# Patient Record
Sex: MALE | Race: WHITE | NOT HISPANIC OR LATINO | URBAN - METROPOLITAN AREA
[De-identification: names, ages, dates, MRNs, and addresses within clinical notes are randomized per-mention and may not be internally consistent; named-entity substitution may affect disease eponyms.]

---

## 2020-06-21 ENCOUNTER — EMERGENCY (EMERGENCY)
Facility: HOSPITAL | Age: 80
LOS: 1 days | Discharge: HOME | End: 2020-06-21
Attending: EMERGENCY MEDICINE
Payer: MEDICARE

## 2020-06-21 VITALS
HEART RATE: 65 BPM | DIASTOLIC BLOOD PRESSURE: 70 MMHG | RESPIRATION RATE: 19 BRPM | TEMPERATURE: 98 F | OXYGEN SATURATION: 95 % | SYSTOLIC BLOOD PRESSURE: 122 MMHG

## 2020-06-21 VITALS
DIASTOLIC BLOOD PRESSURE: 74 MMHG | WEIGHT: 199.96 LBS | RESPIRATION RATE: 18 BRPM | HEIGHT: 73 IN | SYSTOLIC BLOOD PRESSURE: 123 MMHG | HEART RATE: 65 BPM | OXYGEN SATURATION: 94 % | TEMPERATURE: 99 F

## 2020-06-21 LAB
ANION GAP SERPL CALC-SCNC: 16 MMOL/L — SIGNIFICANT CHANGE UP (ref 5–17)
APTT BLD: 31.8 SEC — SIGNIFICANT CHANGE UP (ref 27.5–36.3)
BASOPHILS # BLD AUTO: 0.03 K/UL — SIGNIFICANT CHANGE UP (ref 0–0.2)
BASOPHILS NFR BLD AUTO: 0.4 % — SIGNIFICANT CHANGE UP (ref 0–2)
BUN SERPL-MCNC: 33 MG/DL — HIGH (ref 8–20)
CALCIUM SERPL-MCNC: 9.1 MG/DL — SIGNIFICANT CHANGE UP (ref 8.6–10.2)
CHLORIDE SERPL-SCNC: 103 MMOL/L — SIGNIFICANT CHANGE UP (ref 98–107)
CO2 SERPL-SCNC: 17 MMOL/L — LOW (ref 22–29)
CREAT SERPL-MCNC: 1.29 MG/DL — SIGNIFICANT CHANGE UP (ref 0.5–1.3)
D DIMER BLD IA.RAPID-MCNC: 255 NG/ML DDU — HIGH
EOSINOPHIL # BLD AUTO: 0.19 K/UL — SIGNIFICANT CHANGE UP (ref 0–0.5)
EOSINOPHIL NFR BLD AUTO: 2.5 % — SIGNIFICANT CHANGE UP (ref 0–6)
GLUCOSE SERPL-MCNC: 121 MG/DL — HIGH (ref 70–99)
HCT VFR BLD CALC: 40.6 % — SIGNIFICANT CHANGE UP (ref 39–50)
HGB BLD-MCNC: 13.8 G/DL — SIGNIFICANT CHANGE UP (ref 13–17)
IMM GRANULOCYTES NFR BLD AUTO: 0.4 % — SIGNIFICANT CHANGE UP (ref 0–1.5)
INR BLD: 1.05 RATIO — SIGNIFICANT CHANGE UP (ref 0.88–1.16)
LYMPHOCYTES # BLD AUTO: 1.4 K/UL — SIGNIFICANT CHANGE UP (ref 1–3.3)
LYMPHOCYTES # BLD AUTO: 18.5 % — SIGNIFICANT CHANGE UP (ref 13–44)
MCHC RBC-ENTMCNC: 33.3 PG — SIGNIFICANT CHANGE UP (ref 27–34)
MCHC RBC-ENTMCNC: 34 GM/DL — SIGNIFICANT CHANGE UP (ref 32–36)
MCV RBC AUTO: 98.1 FL — SIGNIFICANT CHANGE UP (ref 80–100)
MONOCYTES # BLD AUTO: 0.62 K/UL — SIGNIFICANT CHANGE UP (ref 0–0.9)
MONOCYTES NFR BLD AUTO: 8.2 % — SIGNIFICANT CHANGE UP (ref 2–14)
NEUTROPHILS # BLD AUTO: 5.3 K/UL — SIGNIFICANT CHANGE UP (ref 1.8–7.4)
NEUTROPHILS NFR BLD AUTO: 70 % — SIGNIFICANT CHANGE UP (ref 43–77)
NT-PROBNP SERPL-SCNC: 508 PG/ML — HIGH (ref 0–300)
PLATELET # BLD AUTO: 160 K/UL — SIGNIFICANT CHANGE UP (ref 150–400)
POTASSIUM SERPL-MCNC: 3.9 MMOL/L — SIGNIFICANT CHANGE UP (ref 3.5–5.3)
POTASSIUM SERPL-SCNC: 3.9 MMOL/L — SIGNIFICANT CHANGE UP (ref 3.5–5.3)
PROTHROM AB SERPL-ACNC: 11.9 SEC — SIGNIFICANT CHANGE UP (ref 10–12.9)
RBC # BLD: 4.14 M/UL — LOW (ref 4.2–5.8)
RBC # FLD: 12.8 % — SIGNIFICANT CHANGE UP (ref 10.3–14.5)
SODIUM SERPL-SCNC: 136 MMOL/L — SIGNIFICANT CHANGE UP (ref 135–145)
TROPONIN T SERPL-MCNC: <0.01 NG/ML — SIGNIFICANT CHANGE UP (ref 0–0.06)
TROPONIN T SERPL-MCNC: <0.01 NG/ML — SIGNIFICANT CHANGE UP (ref 0–0.06)
WBC # BLD: 7.57 K/UL — SIGNIFICANT CHANGE UP (ref 3.8–10.5)
WBC # FLD AUTO: 7.57 K/UL — SIGNIFICANT CHANGE UP (ref 3.8–10.5)

## 2020-06-21 PROCEDURE — 85730 THROMBOPLASTIN TIME PARTIAL: CPT

## 2020-06-21 PROCEDURE — 84484 ASSAY OF TROPONIN QUANT: CPT

## 2020-06-21 PROCEDURE — 93010 ELECTROCARDIOGRAM REPORT: CPT

## 2020-06-21 PROCEDURE — 80048 BASIC METABOLIC PNL TOTAL CA: CPT

## 2020-06-21 PROCEDURE — 85379 FIBRIN DEGRADATION QUANT: CPT

## 2020-06-21 PROCEDURE — 99285 EMERGENCY DEPT VISIT HI MDM: CPT

## 2020-06-21 PROCEDURE — 85610 PROTHROMBIN TIME: CPT

## 2020-06-21 PROCEDURE — 71045 X-RAY EXAM CHEST 1 VIEW: CPT

## 2020-06-21 PROCEDURE — 71045 X-RAY EXAM CHEST 1 VIEW: CPT | Mod: 26

## 2020-06-21 PROCEDURE — 36415 COLL VENOUS BLD VENIPUNCTURE: CPT

## 2020-06-21 PROCEDURE — 93005 ELECTROCARDIOGRAM TRACING: CPT

## 2020-06-21 PROCEDURE — 85027 COMPLETE CBC AUTOMATED: CPT

## 2020-06-21 PROCEDURE — 83880 ASSAY OF NATRIURETIC PEPTIDE: CPT

## 2020-06-21 PROCEDURE — 99283 EMERGENCY DEPT VISIT LOW MDM: CPT | Mod: 25

## 2020-06-21 RX ORDER — ASPIRIN/CALCIUM CARB/MAGNESIUM 324 MG
81 TABLET ORAL ONCE
Refills: 0 | Status: COMPLETED | OUTPATIENT
Start: 2020-06-21 | End: 2020-06-21

## 2020-06-21 RX ADMIN — Medication 81 MILLIGRAM(S): at 20:19

## 2020-06-21 NOTE — ED PROVIDER NOTE - PATIENT PORTAL LINK FT
You can access the FollowMyHealth Patient Portal offered by [facility] by registering at the following website: [email or web address]. By joining [employer]s FollowMyHealth portal, you will also be able to view your health information using other applications (apps) compatible with our system.

## 2020-06-21 NOTE — ED PROVIDER NOTE - OBJECTIVE STATEMENT
80 y/o M pt with hx of HTN, BPH and gout presents to ED c/o SOB and L sided CP that onset at 18:00 today. Pt states he became profoundly SOB which worsened with bending over. Pt states he has had SOB over the last few weeks with bending over and getting up from a seated position. Pt saw a cardiologist in [place], and had a normal catheterization 2 years ago. denies fever. denies HA or neck pain. no abd pain. no n/v/d. no urinary f/u/d. no back pain. no motor or sensory deficits. denies illicit drug use. no recent travel. no rash. no other acute issues symptoms or concerns

## 2020-06-21 NOTE — ED PROVIDER NOTE - NSFOLLOWUPCLINICS_GEN_ALL_ED_FT
[facility]  Cardiology  [address] [place] [address]  Phone: [phone]  Fax:   Follow Up Time:

## 2020-06-21 NOTE — ED ADULT TRIAGE NOTE - CHIEF COMPLAINT QUOTE
patient alert and oriented x 4 in no distress states in full sentences that he has chest pain with SOB- feels like he is having difficulty breathing describes as gasping like " I just ran"

## 2020-06-21 NOTE — ED PROVIDER NOTE - CLINICAL SUMMARY MEDICAL DECISION MAKING FREE TEXT BOX
pt jay better states he wants d/c home cards rec given for here. do not suspect pe clincally dimer used in age adjusted literature clinically . stressed improtance of pcp and and cards f/u states had neg cath 2 years prior. return to ed for intractable cp sob or any overall worsening

## 2020-06-21 NOTE — ED ADULT NURSE NOTE - OBJECTIVE STATEMENT
pt received A+Ox4, in no apparent distress. pt c/o intermittent CP and SOB since 1800 today, worsening with exertion. pt states hx of one stent 2 yrs ago and hx of HTN, compliant with meds at home per pt. pt denies any fevers, cough, sick contacts. RR even and unlabored at this time, placed on cardiac monitor and  upon arrival. breath sounds clear b/l, pt denies any leg swelling. pt educated on POC, verbalized understanding. pt safety measures maintained.

## 2020-06-21 NOTE — ED PROVIDER NOTE - PROGRESS NOTE DETAILS
Pt in NAD, asking for d/c home, will recommend cardiology f/u. Pt is anxious about getting home. I told pt I would like to repeat troponin prior to d/c, pt agrees to stay for repeat trop, and agrees with plan.

## 2020-06-23 ENCOUNTER — TRANSCRIPTION ENCOUNTER (OUTPATIENT)
Age: 80
End: 2020-06-23

## 2020-06-30 PROBLEM — Z00.00 ENCOUNTER FOR PREVENTIVE HEALTH EXAMINATION: Status: ACTIVE | Noted: 2020-06-30

## 2020-07-01 ENCOUNTER — APPOINTMENT (OUTPATIENT)
Dept: DERMATOLOGY | Facility: CLINIC | Age: 80
End: 2020-07-01
Payer: MEDICARE

## 2020-07-01 ENCOUNTER — APPOINTMENT (OUTPATIENT)
Dept: DERMATOLOGY | Facility: CLINIC | Age: 80
End: 2020-07-01

## 2020-07-01 VITALS — BODY MASS INDEX: 29.12 KG/M2 | HEIGHT: 72 IN | WEIGHT: 215 LBS

## 2020-07-01 DIAGNOSIS — Z84.0 FAMILY HISTORY OF DISEASES OF THE SKIN AND SUBCUTANEOUS TISSUE: ICD-10-CM

## 2020-07-01 DIAGNOSIS — M54.12 RADICULOPATHY, CERVICAL REGION: ICD-10-CM

## 2020-07-01 DIAGNOSIS — Z87.2 PERSONAL HISTORY OF DISEASES OF THE SKIN AND SUBCUTANEOUS TISSUE: ICD-10-CM

## 2020-07-01 DIAGNOSIS — Z87.39 PERSONAL HISTORY OF OTHER DISEASES OF THE MUSCULOSKELETAL SYSTEM AND CONNECTIVE TISSUE: ICD-10-CM

## 2020-07-01 DIAGNOSIS — D69.2 OTHER NONTHROMBOCYTOPENIC PURPURA: ICD-10-CM

## 2020-07-01 PROCEDURE — 99203 OFFICE O/P NEW LOW 30 MIN: CPT

## 2020-07-01 NOTE — PHYSICAL EXAM
[Alert] : alert [Well Nourished] : well nourished [Oriented x 3] : ~L oriented x 3 [FreeTextEntry3] : The following areas were examined and no significant abnormalities were seen except as noted below:\par \par Type II skin\par \par scalp, face, eyelids, nose, lips, ears, neck, chest, abdomen, back,groin, buttocks, right arm, left arm, right hand, left hand,\par right  leg, left leg, right foot, left foot\par \par Left upper cheek: 7 x 5 mm focally avulsed tan verrucous plaque\par Forearms: No rash or purpura present\par \par No suspicious lesion seen\par \par

## 2020-07-01 NOTE — HISTORY OF PRESENT ILLNESS
[FreeTextEntry1] : Blood spots [de-identified] : First visit for this 79-year-old white male presenting with with a few year history of "blood spots" on the forearms.  Previously diagnosed as having "idiopathic purpura" Rash is not present today.\par patient also complains of persistent "tingling" in both forearms, especially the left. Has history of a C3/C4 fusion. Prescribed gabapentin but does not wish to take it.

## 2020-07-01 NOTE — ASSESSMENT
[FreeTextEntry1] : idiopathic senile purpura by history\par Seborrheic keratosis, traumatized the left cheek

## 2020-07-16 ENCOUNTER — APPOINTMENT (OUTPATIENT)
Dept: DERMATOLOGY | Facility: CLINIC | Age: 80
End: 2020-07-16
Payer: MEDICARE

## 2020-07-16 DIAGNOSIS — L82.0 INFLAMED SEBORRHEIC KERATOSIS: ICD-10-CM

## 2020-07-16 PROCEDURE — 17110 DESTRUCTION B9 LES UP TO 14: CPT

## 2020-07-16 NOTE — HISTORY OF PRESENT ILLNESS
[de-identified] : Followup visit for a 80-year-old white male first seen by me on July 1, 2020, presenting today with a tender lesion on the left upper cheek.   [FreeTextEntry1] : Growth on left cheek

## 2020-07-16 NOTE — PHYSICAL EXAM
[Alert] : alert [Oriented x 3] : ~L oriented x 3 [Well Nourished] : well nourished [FreeTextEntry3] : Patient wearing a facemask\par \par Left upper-inner cheek.: 8X7 millimeter crusted tan verrucous plaque

## 2020-07-16 NOTE — REVIEW OF SYSTEMS
[Negative] : Constitutional [de-identified] : Large ecchymosis on left posterior neck secondary to a fall

## 2020-07-22 LAB — CORE LAB BIOPSY: NORMAL

## 2020-08-30 ENCOUNTER — INPATIENT (INPATIENT)
Facility: HOSPITAL | Age: 80
LOS: 3 days | Discharge: HOME | DRG: 286 | End: 2020-09-03
Attending: HOSPITALIST | Admitting: EMERGENCY MEDICINE
Payer: MEDICARE

## 2020-08-30 VITALS
WEIGHT: 214.95 LBS | OXYGEN SATURATION: 95 % | TEMPERATURE: 98 F | HEART RATE: 57 BPM | SYSTOLIC BLOOD PRESSURE: 119 MMHG | HEIGHT: 72 IN | DIASTOLIC BLOOD PRESSURE: 76 MMHG | RESPIRATION RATE: 25 BRPM

## 2020-08-30 DIAGNOSIS — Z98.890 OTHER SPECIFIED POSTPROCEDURAL STATES: Chronic | ICD-10-CM

## 2020-08-30 DIAGNOSIS — Z90.49 ACQUIRED ABSENCE OF OTHER SPECIFIED PARTS OF DIGESTIVE TRACT: Chronic | ICD-10-CM

## 2020-08-30 LAB
ALBUMIN SERPL ELPH-MCNC: 3.8 G/DL — SIGNIFICANT CHANGE UP (ref 3.3–5.2)
ALP SERPL-CCNC: 59 U/L — SIGNIFICANT CHANGE UP (ref 40–120)
ALT FLD-CCNC: 10 U/L — SIGNIFICANT CHANGE UP
ANION GAP SERPL CALC-SCNC: 11 MMOL/L — SIGNIFICANT CHANGE UP (ref 5–17)
APPEARANCE UR: CLEAR — SIGNIFICANT CHANGE UP
AST SERPL-CCNC: 16 U/L — SIGNIFICANT CHANGE UP
BASOPHILS # BLD AUTO: 0.03 K/UL — SIGNIFICANT CHANGE UP (ref 0–0.2)
BASOPHILS NFR BLD AUTO: 0.4 % — SIGNIFICANT CHANGE UP (ref 0–2)
BILIRUB SERPL-MCNC: 0.6 MG/DL — SIGNIFICANT CHANGE UP (ref 0.4–2)
BILIRUB UR-MCNC: NEGATIVE — SIGNIFICANT CHANGE UP
BUN SERPL-MCNC: 20 MG/DL — SIGNIFICANT CHANGE UP (ref 8–20)
CALCIUM SERPL-MCNC: 8.9 MG/DL — SIGNIFICANT CHANGE UP (ref 8.6–10.2)
CHLORIDE SERPL-SCNC: 106 MMOL/L — SIGNIFICANT CHANGE UP (ref 98–107)
CO2 SERPL-SCNC: 22 MMOL/L — SIGNIFICANT CHANGE UP (ref 22–29)
COLOR SPEC: YELLOW — SIGNIFICANT CHANGE UP
CREAT SERPL-MCNC: 0.96 MG/DL — SIGNIFICANT CHANGE UP (ref 0.5–1.3)
DIFF PNL FLD: NEGATIVE — SIGNIFICANT CHANGE UP
EOSINOPHIL # BLD AUTO: 0.32 K/UL — SIGNIFICANT CHANGE UP (ref 0–0.5)
EOSINOPHIL NFR BLD AUTO: 4.4 % — SIGNIFICANT CHANGE UP (ref 0–6)
GLUCOSE SERPL-MCNC: 120 MG/DL — HIGH (ref 70–99)
GLUCOSE UR QL: NEGATIVE MG/DL — SIGNIFICANT CHANGE UP
HCT VFR BLD CALC: 39.2 % — SIGNIFICANT CHANGE UP (ref 39–50)
HGB BLD-MCNC: 13.3 G/DL — SIGNIFICANT CHANGE UP (ref 13–17)
IMM GRANULOCYTES NFR BLD AUTO: 0.5 % — SIGNIFICANT CHANGE UP (ref 0–1.5)
KETONES UR-MCNC: NEGATIVE — SIGNIFICANT CHANGE UP
LEUKOCYTE ESTERASE UR-ACNC: ABNORMAL
LYMPHOCYTES # BLD AUTO: 1.64 K/UL — SIGNIFICANT CHANGE UP (ref 1–3.3)
LYMPHOCYTES # BLD AUTO: 22.5 % — SIGNIFICANT CHANGE UP (ref 13–44)
MAGNESIUM SERPL-MCNC: 1.6 MG/DL — SIGNIFICANT CHANGE UP (ref 1.6–2.6)
MCHC RBC-ENTMCNC: 33.3 PG — SIGNIFICANT CHANGE UP (ref 27–34)
MCHC RBC-ENTMCNC: 33.9 GM/DL — SIGNIFICANT CHANGE UP (ref 32–36)
MCV RBC AUTO: 98.2 FL — SIGNIFICANT CHANGE UP (ref 80–100)
MONOCYTES # BLD AUTO: 0.69 K/UL — SIGNIFICANT CHANGE UP (ref 0–0.9)
MONOCYTES NFR BLD AUTO: 9.5 % — SIGNIFICANT CHANGE UP (ref 2–14)
NEUTROPHILS # BLD AUTO: 4.57 K/UL — SIGNIFICANT CHANGE UP (ref 1.8–7.4)
NEUTROPHILS NFR BLD AUTO: 62.7 % — SIGNIFICANT CHANGE UP (ref 43–77)
NITRITE UR-MCNC: NEGATIVE — SIGNIFICANT CHANGE UP
NT-PROBNP SERPL-SCNC: 647 PG/ML — HIGH (ref 0–300)
PH UR: 5 — SIGNIFICANT CHANGE UP (ref 5–8)
PLATELET # BLD AUTO: 143 K/UL — LOW (ref 150–400)
POTASSIUM SERPL-MCNC: 4 MMOL/L — SIGNIFICANT CHANGE UP (ref 3.5–5.3)
POTASSIUM SERPL-SCNC: 4 MMOL/L — SIGNIFICANT CHANGE UP (ref 3.5–5.3)
PROT SERPL-MCNC: 6.2 G/DL — LOW (ref 6.6–8.7)
PROT UR-MCNC: NEGATIVE MG/DL — SIGNIFICANT CHANGE UP
RBC # BLD: 3.99 M/UL — LOW (ref 4.2–5.8)
RBC # FLD: 13.5 % — SIGNIFICANT CHANGE UP (ref 10.3–14.5)
RBC CASTS # UR COMP ASSIST: NEGATIVE /HPF — SIGNIFICANT CHANGE UP (ref 0–4)
SODIUM SERPL-SCNC: 139 MMOL/L — SIGNIFICANT CHANGE UP (ref 135–145)
SP GR SPEC: 1.02 — SIGNIFICANT CHANGE UP (ref 1.01–1.02)
TROPONIN T SERPL-MCNC: <0.01 NG/ML — SIGNIFICANT CHANGE UP (ref 0–0.06)
TROPONIN T SERPL-MCNC: <0.01 NG/ML — SIGNIFICANT CHANGE UP (ref 0–0.06)
UROBILINOGEN FLD QL: NEGATIVE MG/DL — SIGNIFICANT CHANGE UP
WBC # BLD: 7.29 K/UL — SIGNIFICANT CHANGE UP (ref 3.8–10.5)
WBC # FLD AUTO: 7.29 K/UL — SIGNIFICANT CHANGE UP (ref 3.8–10.5)
WBC UR QL: SIGNIFICANT CHANGE UP

## 2020-08-30 PROCEDURE — 71046 X-RAY EXAM CHEST 2 VIEWS: CPT | Mod: 26

## 2020-08-30 PROCEDURE — 99220: CPT | Mod: CS

## 2020-08-30 RX ORDER — ISOSORBIDE MONONITRATE 60 MG/1
30 TABLET, EXTENDED RELEASE ORAL DAILY
Refills: 0 | Status: DISCONTINUED | OUTPATIENT
Start: 2020-08-30 | End: 2020-09-03

## 2020-08-30 RX ORDER — ALLOPURINOL 300 MG
100 TABLET ORAL DAILY
Refills: 0 | Status: DISCONTINUED | OUTPATIENT
Start: 2020-08-30 | End: 2020-09-03

## 2020-08-30 RX ORDER — TEMAZEPAM 15 MG/1
15 CAPSULE ORAL AT BEDTIME
Refills: 0 | Status: DISCONTINUED | OUTPATIENT
Start: 2020-08-30 | End: 2020-09-03

## 2020-08-30 RX ORDER — ASPIRIN/CALCIUM CARB/MAGNESIUM 324 MG
162 TABLET ORAL ONCE
Refills: 0 | Status: COMPLETED | OUTPATIENT
Start: 2020-08-30 | End: 2020-08-30

## 2020-08-30 RX ORDER — CARVEDILOL PHOSPHATE 80 MG/1
12.5 CAPSULE, EXTENDED RELEASE ORAL EVERY 12 HOURS
Refills: 0 | Status: DISCONTINUED | OUTPATIENT
Start: 2020-08-30 | End: 2020-08-31

## 2020-08-30 RX ORDER — ESCITALOPRAM OXALATE 10 MG/1
10 TABLET, FILM COATED ORAL DAILY
Refills: 0 | Status: DISCONTINUED | OUTPATIENT
Start: 2020-08-30 | End: 2020-09-03

## 2020-08-30 RX ORDER — ALPRAZOLAM 0.25 MG
0.5 TABLET ORAL
Refills: 0 | Status: DISCONTINUED | OUTPATIENT
Start: 2020-08-30 | End: 2020-09-03

## 2020-08-30 RX ORDER — TAMSULOSIN HYDROCHLORIDE 0.4 MG/1
0.4 CAPSULE ORAL AT BEDTIME
Refills: 0 | Status: DISCONTINUED | OUTPATIENT
Start: 2020-08-30 | End: 2020-09-03

## 2020-08-30 RX ADMIN — Medication 162 MILLIGRAM(S): at 10:59

## 2020-08-30 NOTE — ED ADULT NURSE NOTE - CHPI ED NUR SYMPTOMS NEG
no chest pain/no chills/no diaphoresis/no dizziness/no fever/no nausea/no syncope/no congestion/no back pain/no vomiting

## 2020-08-30 NOTE — ED PROVIDER NOTE - PSH
Guideline for managing Lantus insulin: GO UP TO 60u of lantus    Check your fasting blood sugars every morning for 5 days.  Goal is to have most of your fasting blood sugars below 140.    If most of your fasting blood sugars in that 5 day period are above 140, increase your Lantus dose by 5 units.    If most of your fasting blood sugars in that 5 day period of are below 140 but higher than 70, keep your Lantus dose the same.    If most of your fasting blood sugars in that 5 day period are below 70, decrease your Lantus dose by 5 units.    Make sure to write your blood sugars down in a notebook; do not simply just store the numbers in your glucose meter.        
H/O cardiac catheterization

## 2020-08-30 NOTE — ED PROVIDER NOTE - PMH
BPH (benign prostatic hyperplasia)    Gout    Hypertension, unspecified type    NSTEMI (non-ST elevated myocardial infarction)  2009  ST elevation myocardial infarction (STEMI), unspecified artery  2002, s/p stent x1  TIA (transient ischemic attack)

## 2020-08-30 NOTE — CONSULT NOTE ADULT - SUBJECTIVE AND OBJECTIVE BOX
[facility]                                                               Office:  [address] [place] [address]                                                              Telephone: [phone]. Fax:[phone]                                                                        CARDIOLOGY CONSULTATION NOTE                                                                                             Reason for Consultation: Chest pain   History obtained by: Patient and medical record   obtained: No    Chief complaint:    Patient is a 80y old  Male who presents with a chief complaint of short of breath      HPI: 81 y/o M PMH MI 2002, 2009, PCI x 1, TIA 2010, HTN, BPH presents to ED with c/o difficulty breathing x 3 weeks. Breathing worse with exertion, improved with rest, alprazolam. Also endorses palpitations, lightheaded, dizziness. States hospitalized in July for syncope/difficulty breathing, [place] [facility] [place]  Cardiac cath 2018- normal, no blockages.  Denies fever, chills, phlegm production, orthopnea, PND, edema, chest pain, pressure, irregular, fast heart beat, nausea, vomiting, melena, rectal bleed, hematuria, syncope.      REVIEW OF SYMPTOMS:   CONSTITUTIONAL: No fever, weight loss, or fatigue  ENMT:  No difficulty hearing, tinnitus, vertigo; No sinus or throat pain  NECK: No pain or stiffness  CARDIOVASCULAR: No chest pain, + dyspnea, no syncope, + palpitations, + dizziness, no Orthopnea, no Paroxsymal nocturnal dyspnea  RESPIRATORY: +Dyspnea on exertion, +Shortness of breath, + cough, No wheezing  : No dysuria, no hematuria   GI: No dark color stool, no melena, no diarrhea, no constipation, no abdominal pain   NEURO: No headache, no dizziness, no slurred speech   MUSCULOSKELETAL: No joint pain or swelling; No muscle, back, or extremity pain  PSYCH: No agitation, no anxiety.    ALL OTHER REVIEW OF SYSTEMS ARE NEGATIVE.      ALLERGIES: Allergies  No Known Allergies  Intolerances      PAST MEDICAL HISTORY  BPH (benign prostatic hyperplasia)  Gout  TIA (transient ischemic attack)  NSTEMI (non-ST elevated myocardial infarction)  ST elevation myocardial infarction (STEMI), unspecified artery  Hypertension, unspecified type      PAST SURGICAL HISTORY  H/O cardiac catheterization      FAMILY HISTORY:  No pertinent family history in first degree relatives      SOCIAL HISTORY:  Denies smoking/alcohol/drugs      CURRENT MEDICATIONS      HOME MEDICATIONS:  coreg 12.5 BID  isosorbid 30mg daily  lexapro  allopurinol  gabapentin  flomax  asa   statin    Vital Signs Last 24 Hrs  T(C): 36.7 (30 Aug 2020 07:50), Max: 36.7 (30 Aug 2020 07:50)  T(F): 98 (30 Aug 2020 07:50), Max: 98 (30 Aug 2020 07:50)  HR: 57 (30 Aug 2020 07:50) (57 - 57)  BP: 119/76 (30 Aug 2020 07:50) (119/76 - 119/76)  RR: 25 (30 Aug 2020 07:50) (25 - 25)  SpO2: 95% (30 Aug 2020 07:50) (95% - 95%)      PHYSICAL EXAM:  Constitutional: Comfortable . No acute distress.   HEENT: Atraumatic and normocephalic no JVD. No carotid bruit. PEERL   CNS: A&Ox3. No focal deficits. EOMI.   Respiratory: CTAB  Cardiovascular: S1S2 RRR. No murmur/rubs or gallop.  Gastrointestinal: Soft non-tender and non distended . +Bowel sounds. negative Anand's sign.  Extremities: No edema.   Psychiatric: Calm . no agitation.  Skin: No skin rash/ulcers visualized to face, hands or feet.      LABS:                        13.3   7.29  )-----------( 143      ( 30 Aug 2020 09:28 )             39.2     08-30    139  |  106  |  20.0  ----------------------------<  120<H>  4.0   |  22.0  |  0.96    Ca    8.9      30 Aug 2020 09:28  Mg     1.6     08-30    TPro  6.2<L>  /  Alb  3.8  /  TBili  0.6  /  DBili  x   /  AST  16  /  ALT  10  /  AlkPhos  59  08-30    CARDIAC MARKERS ( 30 Aug 2020 09:28 )  x     / <0.01 ng/mL / x     / x     / x        ;p-BNP=Serum Pro-Brain Natriuretic Peptide: 647 pg/mL (08-30 @ 09:28)          INTERPRETATION OF TELEMETRY: Reviewed by me.   ECG: Reviewed by me.     RADIOLOGY & ADDITIONAL STUDIES:    X-ray:  reviewed by me.   CT scan:   MRI: [facility]                                                               Office:  [address] [place] [address]                                                              Telephone: [phone]. Fax:[phone]                                                                        CARDIOLOGY CONSULTATION NOTE                                                                                             Reason for Consultation: Chest pain   History obtained by: Patient and medical record   obtained: No    Chief complaint:    Patient is a 80y old  Male who presents with a chief complaint of short of breath    HPI: 81 y/o M PMH MI 2002, 2009, PCI x 1, TIA 2010, HTN, BPH presents to ED with c/o difficulty breathing x 3 weeks. Breathing worse with exertion, improved with rest, alprazolam. Also endorses palpitations, lightheaded, dizziness. States hospitalized in July for syncope/difficulty breathing, [place] [facility] [place].  Cardiac cath 2018- normal, no blockages.  Denies fever, chills, phlegm production, orthopnea, PND, edema, chest pain, pressure, irregular, fast heart beat, nausea, vomiting, melena, rectal bleed, hematuria, syncope.      REVIEW OF SYMPTOMS:   CONSTITUTIONAL: No fever, weight loss, or fatigue  ENMT:  No difficulty hearing, tinnitus, vertigo; No sinus or throat pain  NECK: No pain or stiffness  CARDIOVASCULAR: No chest pain, + dyspnea, no syncope, + palpitations, + dizziness, no Orthopnea, no Paroxsymal nocturnal dyspnea  RESPIRATORY: +Dyspnea on exertion, +Shortness of breath, + cough, No wheezing  : No dysuria, no hematuria   GI: No dark color stool, no melena, no diarrhea, no constipation, no abdominal pain   NEURO: No headache, no dizziness, no slurred speech   MUSCULOSKELETAL: No joint pain or swelling; No muscle, back, or extremity pain  PSYCH: No agitation, no anxiety.    ALL OTHER REVIEW OF SYSTEMS ARE NEGATIVE.      ALLERGIES: Allergies  No Known Allergies  Intolerances      PAST MEDICAL HISTORY  BPH (benign prostatic hyperplasia)  Gout  TIA (transient ischemic attack)  NSTEMI (non-ST elevated myocardial infarction)  ST elevation myocardial infarction (STEMI), unspecified artery  Hypertension, unspecified type      PAST SURGICAL HISTORY  H/O cardiac catheterization      FAMILY HISTORY:  No pertinent family history in first degree relatives      SOCIAL HISTORY:  Denies smoking/alcohol/drugs      CURRENT MEDICATIONS      HOME MEDICATIONS:  coreg 12.5 BID  isosorbid 30mg daily  lexapro  allopurinol  gabapentin  flomax  asa   statin    Vital Signs Last 24 Hrs  T(C): 36.7 (30 Aug 2020 07:50), Max: 36.7 (30 Aug 2020 07:50)  T(F): 98 (30 Aug 2020 07:50), Max: 98 (30 Aug 2020 07:50)  HR: 57 (30 Aug 2020 07:50) (57 - 57)  BP: 119/76 (30 Aug 2020 07:50) (119/76 - 119/76)  RR: 25 (30 Aug 2020 07:50) (25 - 25)  SpO2: 95% (30 Aug 2020 07:50) (95% - 95%)      PHYSICAL EXAM:  Constitutional: Comfortable . No acute distress.   HEENT: Atraumatic and normocephalic no JVD. No carotid bruit. PEERL   CNS: A&Ox3. No focal deficits. EOMI.   Respiratory: CTAB  Cardiovascular: S1S2 RRR. No murmur/rubs or gallop.  Gastrointestinal: Soft non-tender and non distended . +Bowel sounds. negative Anand's sign.  Extremities: No edema.   Psychiatric: Calm . no agitation.  Skin: No skin rash/ulcers visualized to face, hands or feet.      LABS:                        13.3   7.29  )-----------( 143      ( 30 Aug 2020 09:28 )             39.2     08-30    139  |  106  |  20.0  ----------------------------<  120<H>  4.0   |  22.0  |  0.96    Ca    8.9      30 Aug 2020 09:28  Mg     1.6     08-30    TPro  6.2<L>  /  Alb  3.8  /  TBili  0.6  /  DBili  x   /  AST  16  /  ALT  10  /  AlkPhos  59  08-30    CARDIAC MARKERS ( 30 Aug 2020 09:28 )  x     / <0.01 ng/mL / x     / x     / x        ;p-BNP=Serum Pro-Brain Natriuretic Peptide: 647 pg/mL (08-30 @ 09:28)          INTERPRETATION OF TELEMETRY: Reviewed by me.   ECG: Reviewed by me.     RADIOLOGY & ADDITIONAL STUDIES:    X-ray:  reviewed by me.   CT scan:   MRI:

## 2020-08-30 NOTE — ED CDU PROVIDER INITIAL DAY NOTE - ATTENDING CONTRIBUTION TO CARE
I, Jose Luis Dao, have personally performed a face to face diagnostic evaluation on this patient. I have reviewed the ACP note and agree with the history, exam and plan of care, except as noted.    79 yo hx of MI, stent, BPH, gout, HTN p/w exersional dyspnea and chest pain. trop negative. cxr clear. EKG sinus @66, no ischemic changes. patient seen by cardiology, will need serial trop, echo, and stress tomorrow.

## 2020-08-30 NOTE — CONSULT NOTE ADULT - ATTENDING COMMENTS
I have personally seen, examined, and participated in the care of this patient. I have reviewed all pertinent clinical information, including history, physical exam, plan, and the PA/NP's note and agree except as noted below.    80 year old male with CAD (s/p PCI 2009, cath in 2018 normal), HTN, BPH presenting with NASH. He reports that he also had NASH before his first PCI. He had similar symptoms in 2018 but cardiac cath in [place] was normal. He recently had an episode of dyspnea and possible syncope for which [place] [facility] [place] but no cardiac work up was performed. ECG non-ischemic but shows sinus bradycardia.    Plan:  - Exercise NST to test both chronotropic competence and ischemia  - If chronotropic incompetence and no ischemia, consider PM implant  - Continue all other medications    Thank you for this consult. We will follow with you.    Mango Melchor MD  Clinical Cardiac Electrophysiology

## 2020-08-30 NOTE — ED ADULT NURSE NOTE - NSIMPLEMENTINTERV_GEN_ALL_ED
Implemented All Universal Safety Interventions:  [place] to call system. Call bell, personal items and telephone within reach. Instruct patient to call for assistance. Room bathroom lighting operational. Non-slip footwear when patient is off stretcher. Physically safe environment: no spills, clutter or unnecessary equipment. Stretcher in lowest position, wheels locked, appropriate side rails in place.

## 2020-08-30 NOTE — CONSULT NOTE ADULT - ASSESSMENT
79 y/o M PMH MI 2002, 2009, PCI x 1, TIA 2010, HTN, BPH presents to ED with c/o difficulty breathing x 3 weeks. Breathing worse with exertion, improved with rest, alprazolam. Also endorses palpitations, lightheaded, dizziness. States hospitalized in July for syncope/difficulty breathing, seen at [facility] [place].  Cardiac cath 2018- normal, no blockages.  Denies fever, chills, phlegm production, orthopnea, PND, edema, chest pain, pressure, irregular, fast heart beat, nausea, vomiting, melena, rectal bleed, hematuria, syncope.    CAD  - s/p PCI; 2009?  - EKG- SR, PVCs, lafb  - troponin neg x1  - proBNP 647  - cont asa, statin, imdur  - cotn coreg, can decrease if consistent bradycardia  - NST tomorrow 8/31; NPO after MN  - TTE ordered     HTN  - normotensive   - cont home medication regimen 79 y/o M PMH MI 2002, 2009, PCI x 1, TIA 2010, HTN, BPH presents to ED with c/o difficulty breathing x 3 weeks. Breathing worse with exertion, improved with rest, alprazolam. Also endorses palpitations, lightheaded, dizziness. States hospitalized in July for syncope/difficulty breathing[place] [facility] [place]  Cardiac cath 2018- normal, no blockages.  Denies fever, chills, phlegm production, orthopnea, PND, edema, chest pain, pressure, irregular, fast heart beat, nausea, vomiting, melena, rectal bleed, hematuria, syncope.    CAD  - s/p PCI; 2009?  - EKG- SR, PVCs, lafb  - troponin neg x1  - proBNP 647  - cont asa, statin, imdur  - cotn coreg, can decrease if consistent bradycardia  - NST tomorrow 8/31; NPO after MN  - TTE ordered     HTN  - normotensive   - cont home medication regimen     Sinus Bradycardia  - Likely a reflection of sinus node dysfunction  - may be contributing to symptoms of dyspnea on exertion  - will plan for exercise treadmill test to assess chronotropic incompetence  - if patient has chronotropic incompetence and no other reason for NASH, consider PM implant

## 2020-08-30 NOTE — ED ADULT NURSE NOTE - OBJECTIVE STATEMENT
Pt A&OX3, amb ad darryn, c/o sob with minimal exertion X 2 days.  Pt denies any pain or discomfort while at rest.  CM in place, irregular rythym noted, and bradycardic, HR in low 50"s.  Pt denies any fevers/chills, only intermittent cough X 2 weeks.  Clear bsb, abd soft nondistended, nontender, moving all ext well.

## 2020-08-30 NOTE — ED CDU PROVIDER INITIAL DAY NOTE - PSH
H/O cardiac catheterization    H/O cervical spine surgery    H/O hernia repair    S/P appendectomy    S/P cholecystectomy

## 2020-08-30 NOTE — ED PROVIDER NOTE - PROGRESS NOTE DETAILS
Pt remains asymptomatic. CXR unremarkable. Labs significant for , slightly increased from 508 in [place] CXR unremarkable. Labs significant for , slightly increased from 508 in June  Pt reports repeat episode of severe SOB when he stood up from wheelchair to get CXR done. Sx resolved with rest and he remains asx and comfortable lying in stretcher. AJM: pt seen by cards. requests cdu for stress and echo sis dout to dr price

## 2020-08-30 NOTE — ED ADULT TRIAGE NOTE - CHIEF COMPLAINT QUOTE
patient c/o short of breat with chest tightness, yesterday was very hard to breath, was unable to go to [place] ED. but felt better as the day went by felt better, today woke up with chest pressure again. but felt better driving in. patient is hesitant about being evaluated but explained the severity and agreed tob e seen.

## 2020-08-30 NOTE — ED ADULT NURSE NOTE - CHIEF COMPLAINT QUOTE
patient c/o short of breat with chest tightness, yesterday was very hard to breath, was unable to go to [place] ED. but felt better as the day went by felt better, today woke up with chest pressure again. but felt better driving in. patient is hesitant about being evaluated but explained the severity and agreed tob e seen.

## 2020-08-30 NOTE — ED PROVIDER NOTE - CLINICAL SUMMARY MEDICAL DECISION MAKING FREE TEXT BOX
80M with pmhx MI, TIA, HTN p/w CP and SOB x 2 months. Worse with exertion, relieved by rest, no orthopnea or LE edema. EKG shows no STEMI. Concern for NSTEMI vs CHF - will get basic labs, trops, bnp, UA, CXR. Consult cards for echo. 80M with pmhx MI, TIA, HTN p/w CP and SOB x 2 months. Worse with exertion, relieved by rest, no orthopnea or LE edema. EKG shows no STEMI. Concern for angina vs NSTEMI vs CHF - will get basic labs, trops, bnp, UA, CXR. Consult cards for echo vs coronary angio. 80M with pmhx MI, TIA, HTN p/w CP and SOB x 2 months. Worse with exertion, relieved by rest, no orthopnea or LE edema. EKG shows no STEMI. Concern for angina vs NSTEMI vs CHF - will get basic labs, trops, bnp, UA, CXR. Consult cards

## 2020-08-30 NOTE — ED CDU PROVIDER INITIAL DAY NOTE - PROGRESS NOTE DETAILS
Shift change- Pt received from JOHN Wyatt. Pt evaluated at bedside. Pt resting comfortably, no acute complaints.   PE: non toxic, RRR, lungs CTA, abd soft NTND no guarding.  Awaiting NST in am.

## 2020-08-30 NOTE — ED CDU PROVIDER INITIAL DAY NOTE - FAMILY HISTORY
Father  Still living? No  Family history of early CAD, Age at diagnosis: Age Unknown     Mother  Still living? Unknown  Family history of cerebrovascular accident (CVA), Age at diagnosis: Age Unknown

## 2020-08-30 NOTE — ED PROVIDER NOTE - OBJECTIVE STATEMENT
80M with hx of MI (2002, 2009, s/p stent x1), TIA (2010), HTN, BPH, gout, p/w intermittent 9/10 chest pressure and SOB x 2 months, worsening x 3 weeks. Worse with exertion and bending over, relieved by rest and alprazolam. Pt reports 1 episode of syncope without prodrome. Also reports productive cough x 1 month. Denies orthopnea, LE edema, HA, n/v. 80M with hx of MI (2002, 2009, s/p stent x1), TIA (2010), HTN, BPH, gout, p/w intermittent 9/10 chest pressure and SOB x 2 months, worsening x 3 weeks. Worse with exertion and bending over, relieved by rest and alprazolam. Pt reports 1 episode of syncope several weeks ago after walking and then squatting down. Also reports productive cough x 1 month. Denies orthopnea, LE edema, HA, n/v. No history of CHF. No fevers, chills, AMS.

## 2020-08-31 DIAGNOSIS — R06.00 DYSPNEA, UNSPECIFIED: ICD-10-CM

## 2020-08-31 LAB
SARS-COV-2 RNA SPEC QL NAA+PROBE: SIGNIFICANT CHANGE UP
TROPONIN T SERPL-MCNC: <0.01 NG/ML — SIGNIFICANT CHANGE UP (ref 0–0.06)
TSH SERPL-MCNC: 3.13 UIU/ML — SIGNIFICANT CHANGE UP (ref 0.27–4.2)

## 2020-08-31 PROCEDURE — 93016 CV STRESS TEST SUPVJ ONLY: CPT

## 2020-08-31 PROCEDURE — 71275 CT ANGIOGRAPHY CHEST: CPT | Mod: 26

## 2020-08-31 PROCEDURE — 93018 CV STRESS TEST I&R ONLY: CPT

## 2020-08-31 PROCEDURE — 99223 1ST HOSP IP/OBS HIGH 75: CPT | Mod: AI

## 2020-08-31 PROCEDURE — 93010 ELECTROCARDIOGRAM REPORT: CPT

## 2020-08-31 PROCEDURE — 78452 HT MUSCLE IMAGE SPECT MULT: CPT | Mod: 26

## 2020-08-31 PROCEDURE — 99217: CPT | Mod: CS

## 2020-08-31 PROCEDURE — 99232 SBSQ HOSP IP/OBS MODERATE 35: CPT

## 2020-08-31 RX ORDER — GABAPENTIN 400 MG/1
300 CAPSULE ORAL THREE TIMES A DAY
Refills: 0 | Status: DISCONTINUED | OUTPATIENT
Start: 2020-08-31 | End: 2020-09-03

## 2020-08-31 RX ORDER — CARVEDILOL PHOSPHATE 80 MG/1
12.5 CAPSULE, EXTENDED RELEASE ORAL EVERY 12 HOURS
Refills: 0 | Status: DISCONTINUED | OUTPATIENT
Start: 2020-08-31 | End: 2020-09-03

## 2020-08-31 RX ORDER — CARVEDILOL PHOSPHATE 80 MG/1
6.25 CAPSULE, EXTENDED RELEASE ORAL EVERY 12 HOURS
Refills: 0 | Status: DISCONTINUED | OUTPATIENT
Start: 2020-08-31 | End: 2020-08-31

## 2020-08-31 RX ORDER — ASPIRIN/CALCIUM CARB/MAGNESIUM 324 MG
81 TABLET ORAL DAILY
Refills: 0 | Status: DISCONTINUED | OUTPATIENT
Start: 2020-08-31 | End: 2020-09-03

## 2020-08-31 RX ADMIN — ESCITALOPRAM OXALATE 10 MILLIGRAM(S): 10 TABLET, FILM COATED ORAL at 11:39

## 2020-08-31 RX ADMIN — Medication 100 MILLIGRAM(S): at 11:38

## 2020-08-31 RX ADMIN — Medication 81 MILLIGRAM(S): at 16:42

## 2020-08-31 RX ADMIN — GABAPENTIN 300 MILLIGRAM(S): 400 CAPSULE ORAL at 21:07

## 2020-08-31 RX ADMIN — TAMSULOSIN HYDROCHLORIDE 0.4 MILLIGRAM(S): 0.4 CAPSULE ORAL at 11:43

## 2020-08-31 RX ADMIN — Medication 0.5 MILLIGRAM(S): at 20:46

## 2020-08-31 RX ADMIN — ISOSORBIDE MONONITRATE 30 MILLIGRAM(S): 60 TABLET, EXTENDED RELEASE ORAL at 11:39

## 2020-08-31 NOTE — ED ADULT NURSE REASSESSMENT NOTE - NS ED NURSE REASSESS COMMENT FT1
Assumed care of the patient @7:30AM. patient currently in NST. pt to return to CDU 8 after NST test. will eval.
Pt alert ox4. not in acute respiratory distress. V/s stable afebrile. SOB on exertion persist @ time .Continue on CM . Denies chest pain, awaiting for NS T  result. Currently pt has no further question for RN. Safety maintained, support provided will continue to monitor,
Pt in no distress at this time, resting comfortably and calm on stretcher.  Safety measures taken, bed in low position, call bell within reach, side rails up x2. Will continue to monitor.
Received report from RN . Pt to be moved to CDU for observation.
pt stating that he feels "an anomaly" in his chest that started about 2min after his walk around the unit with the observation RN. pt states that it is starting to subside at this time. LOREN varghese made aware, stat EKG ordered. pt moved from chair to stretcher for EKG and states that feeling got worse after that movement. LOREN varghese at bedside for eval.
Received pt from hamilton RN at 1930.  Pt AxO4 in no acute distress, denies chest pain/SOB at this time.  Tele monitor in place, sinus jesse 50s.  Left forearm #20 in place, flushing without difficulty. Safety measures taken, bed in low position, call bell within reach, side rails up x2. Plan of care explained. Pt verbalized understanding. Will continue to monitor.

## 2020-08-31 NOTE — H&P ADULT - NSHPPHYSICALEXAM_GEN_ALL_CORE
Vital Signs Last 24 Hrs  T(C): 36.4 (31 Aug 2020 11:36), Max: 36.8 (30 Aug 2020 15:49)  T(F): 97.6 (31 Aug 2020 11:36), Max: 98.2 (30 Aug 2020 15:49)  HR: 57 (31 Aug 2020 11:36) (47 - 60)  BP: 120/80 (31 Aug 2020 11:36) (120/80 - 147/83)  BP(mean): 99 (31 Aug 2020 05:22) (97 - 104)  RR: 18 (31 Aug 2020 11:36) (16 - 20)  SpO2: 96% (31 Aug 2020 11:36) (95% - 97%)    General appearance: No acute distress, Awake, Alert  HEENT: Normocephalic, Atraumatic, Conjunctiva clear, EOMI  Neck: Supple, No JVD, No tenderness  Lungs: Breath sound equal bilaterally, No wheezes, No rales  Cardiovascular: S1S2, Regular rhythm  Abdomen: Soft, Nontender, Nondistended, No guarding/rebound, Positive bowel sounds  Extremities: No clubbing, No cyanosis, No edema, No calf tenderness  Neuro: Strength equal bilaterally, No tremors  Psychiatric: Appropriate mood, Normal affect

## 2020-08-31 NOTE — ED CDU PROVIDER SUBSEQUENT DAY NOTE - PROGRESS NOTE DETAILS
Alerted by RN of possible st abnormalities on monitor. Pt evaluated at bedside. Pt asymptomatic, sleeping, no CP or SOB. EKG no acute ST changes, reviewed with DR. Abarca. Will send troponin. C/w tele monitor. Pt experienced an "anomaly" in his chest after walking - will order ekg

## 2020-08-31 NOTE — ED CDU PROVIDER SUBSEQUENT DAY NOTE - MEDICAL DECISION MAKING DETAILS
79 y/o M with progressive NASH and CP since July 4th when he had syncopal episode.  NST and cardiology recommendations.

## 2020-08-31 NOTE — PROGRESS NOTE ADULT - ASSESSMENT
A/P: 79 y/o M PMH MI 2002, 2009, PCI x 1, TIA 2010, HTN, BPH presents to ED with c/o difficulty breathing x 3 weeks. Breathing worse with exertion, improved with rest, alprazolam. Also endorses palpitations, lightheaded, dizziness. States hospitalized in July for syncope/difficulty breathing, [place] [facility] [place]  Cardiac cath 2018- normal, no blockages.  Denies fever, chills, phlegm production, orthopnea, PND, edema, chest pain, pressure, irregular, fast heart beat, nausea, vomiting, melena, rectal bleed, hematuria, syncope.    CAD  - s/p PCI; 2009?  - EKG- SR, PVCs, lafb  - troponin neg x1  - proBNP 647  - Cont asa, statin, imdur  - Cont coreg, can decrease if consistent bradycardia  - Echo with normal EF, grade I DD, mild to mod MR, and mod AS>   - NST results pending.     HTN  - normotensive   - cont home medication regimen     Sinus Bradycardia  - Decreased coreg yesterday.   - Patient with a few beats of AIVR, and having frequent PVCs.   - Ambulated patient with good chronotropic response.   - No evidence of chronotropic incompetence.     Preliminary evaluation, please await official recommendations by Dr. Myers A/P: 79 y/o M PMH MI 2002, 2009, PCI x 1, TIA 2010, HTN, BPH presents to ED with c/o difficulty breathing x 3 weeks. Breathing worse with exertion, improved with rest, alprazolam. Also endorses palpitations, lightheaded, dizziness. States hospitalized in July for syncope/difficulty breathing, seen at [facility] [place].  Cardiac cath 2018- normal, no blockages.  Denies fever, chills, phlegm production, orthopnea, PND, edema, chest pain, pressure, irregular, fast heart beat, nausea, vomiting, melena, rectal bleed, hematuria, syncope.    CAD  - s/p PCI; 2009?  - EKG- SR, PVCs, lafb  - troponin neg x1  - proBNP 647  - Cont asa, statin, imdur  - Cont coreg, can decrease if consistent bradycardia  - Echo with normal EF, grade I DD, mild to mod MR, and mod AS>   - NST results pending.     HTN  - normotensive   - cont home medication regimen     Sinus Bradycardia  - Decreased coreg yesterday.   - Patient with a few beats of AIVR, and having frequent PVCs.   - Ambulated patient with good chronotropic response.   - No evidence of chronotropic incompetence.

## 2020-08-31 NOTE — ED ADULT NURSE REASSESSMENT NOTE - COMFORT CARE
plan of care explained/po fluids offered/meal provided/wait time explained
pt uses urinal at bedside/wait time explained/meal provided/po fluids offered/plan of care explained
wait time explained/used urinal in stretcher/plan of care explained
side rails up/plan of care explained/po fluids offered
wait time explained/ambulated to bathroom/plan of care explained

## 2020-08-31 NOTE — PROGRESS NOTE ADULT - ATTENDING COMMENTS
Patient seen and examined by me, Dr. Myers and Lisa PIMENTEL  Physical exam   Cardiac: + S1S2 RRR   Chest CTA   Extremities: no edema cyanosis or clubbing     Labs reviewed D-dimer 226     A/P: 79 y/o M PMH MI 2002, 2009, PCI x 1, TIA 2010, HTN, BPH presents to ED with c/o difficulty breathing x 3 weeks. Breathing worse with exertion, improved with rest, alprazolam. Also endorses palpitations, lightheaded, dizziness. States hospitalized in July for syncope/difficulty breathing, seen at [facility] [place].  Cardiac cath 2018- normal, no blockages.  Denies fever, chills, phlegm production, orthopnea, PND, edema, chest pain, pressure, irregular, fast heart beat, nausea, vomiting, melena, rectal bleed, hematuria, syncope.    CAD s/p PCI; 2009?  - EKG- SR, PVCs, lafb  - troponin neg x1 and proBNP 647  - Cont asa, statin, imdur  - Cont coreg 12.5mg po q 12hrs   - Echo with normal EF, grade I DD, mild to mod MR, and mod AS>   - NST result showed no evidence of ischemia Abnormal; No cardiac symptoms. No ischemic ECG changes. Scar in RCA territory. Segmental wall motion abnormalities in RCA territory. The left ventricle was normal in size.  Post-stress resting myocardial perfusion gated SPECT imaging was performed (LVEF = 44 %) LV Diastolic dysfunction present.  RV dysfunction present.  - based on Nuclear stress test and elevated D-dimer will proceed with CTA of the pulmonary arteries to rule out PE; if this is negative consider LHC (NPO after midnight encourage oral hydration)     HTN  - normotensive   - cont home medication regimen     Sinus Bradycardia  - on Coreg 12.5mg po BID; no evidence of chronotropic incompetence   - Patient with a few beats of AIVR, and having frequent PVCs.   - Ambulated patient with good chronotropic response.     Thank You   Bebe Myers D.O.   Cardiology Attending   #[phone]

## 2020-08-31 NOTE — ED ADULT NURSE REASSESSMENT NOTE - GENERAL PATIENT STATE
comfortable appearance/cooperative/resting/sleeping/smiling/interactive
cooperative/resting/sleeping/smiling/interactive/comfortable appearance
resting/sleeping/smiling/interactive/comfortable appearance/cooperative
cooperative/comfortable appearance/resting/sleeping
comfortable appearance/cooperative

## 2020-08-31 NOTE — ED ADULT NURSE REASSESSMENT NOTE - SYMPTOMS
dizziness/pt reported feeling dizzy after turning head up and to the left to look at the TV
none
NASH

## 2020-08-31 NOTE — H&P ADULT - NSICDXPASTMEDICALHX_GEN_ALL_CORE_FT
PAST MEDICAL HISTORY:  BPH (benign prostatic hyperplasia)     Gout     Hypertension, unspecified type     NSTEMI (non-ST elevated myocardial infarction) 2009    ST elevation myocardial infarction (STEMI), unspecified artery 2002, s/p stent x1    TIA (transient ischemic attack)

## 2020-08-31 NOTE — H&P ADULT - NSICDXPASTSURGICALHX_GEN_ALL_CORE_FT
PAST SURGICAL HISTORY:  H/O cardiac catheterization     H/O cervical spine surgery     H/O hernia repair     S/P appendectomy     S/P cholecystectomy

## 2020-08-31 NOTE — ED ADULT NURSE REASSESSMENT NOTE - ANCILLARY STATUS
NST in a.m./awaiting radiology
UA results pending. Also pending TTE & NST./lab results pending/awaiting radiology
awaiting radiology/NST in a.m.
awaiting Nuclear Stress Test
radiology results pending/NST results

## 2020-08-31 NOTE — PROGRESS NOTE ADULT - SUBJECTIVE AND OBJECTIVE BOX
[place] [facility]                                                               Office: [address] [address]                                                              Telephone: [phone]. Fax:[phone]                                                                             PROGRESS NOTE  Reason for follow up: Dyspnea on exertion  Overnight: Patient with a few beats of AIVR.   Update: Patient underwent NST earlier this morning, states that he still has some dyspnea when he walks around. No chest pain or chest pressure at this time. No palpitations.     Review of symptoms:   Cardiac:  No chest pain. + dyspnea. No palpitations.  Respiratory: No cough. + dyspnea  Gastrointestinal: No diarrhea. No abdominal pain. No bleeding.     Past medical history: No updates.   	  Vitals:  T(C): 36.4 (08-31-20 @ 11:36), Max: 36.8 (08-30-20 @ 15:49)  HR: 57 (08-31-20 @ 11:36) (47 - 60)  BP: 120/80 (08-31-20 @ 11:36) (120/78 - 147/83)  RR: 18 (08-31-20 @ 11:36) (16 - 20)  SpO2: 96% (08-31-20 @ 11:36) (95% - 97%)  Wt(kg): --  I&O's Summary    Weight (kg): 97.5 (08-30 @ 07:50)      PHYSICAL EXAM:  Appearance: Comfortable. No acute distress  HEENT:  Head and neck: Atraumatic. Normocephalic.  Normal oral mucosa, PERRL, Neck is supple. No JVD, No carotid bruit.   Neurologic: A&Ox 3, no focal deficits. EOMI, Cranial nerves are intact.  Lymphatic: No cervical lymphadenopathy  Cardiovascular: Normal S1 S2, No murmur, rubs/gallops. No JVD, No edema  Respiratory: Lungs clear to auscultation  Gastrointestinal:  Soft, Non-tender, + BS  Lower Extremities: No edema  Psychiatry: Patient is calm. No agitation. Mood & affect appropriate  Skin: No rashes/ecchymoses/cyanosis/ulcers visualized on the face, hands or feet.      CURRENT MEDICATIONS:  carvedilol 12.5 milliGRAM(s) Oral every 12 hours  isosorbide   mononitrate ER Tablet (IMDUR) 30 milliGRAM(s) Oral daily  tamsulosin 0.4 milliGRAM(s) Oral at bedtime    ALPRAZolam  escitalopram  allopurinol      DIAGNOSTIC TESTING:  [ ] Echocardiogram:   < from: TTE Echo Complete w/ Contrast w/ Doppler (08.30.20 @ 13:51) >    PHYSICIAN INTERPRETATION:  Left Ventricle: The left ventricular internal cavity size is mildly increased.  Left ventricular ejection fraction, by visual estimation, is 50 to 55%. Spectral Doppler shows impaired relaxation pattern of left ventricular myocardial filling (Grade I diastolic dysfunction).  Right Ventricle: The right ventricle was not well visualized. The right ventricular size is normal. TV S' 0.1 m/s.  Left Atrium: The left atrium is normal in size.  Right Atrium: The right atrium is normal in size.  Pericardium: There is no evidence of pericardial effusion.  Mitral Valve: Mild to moderate mitral valve regurgitation is seen.  Tricuspid Valve: Trivial tricuspid regurgitation is visualized.  Aortic Valve: Peak transaortic gradient equals 19.7 mmHg, mean transaortic gradient equals 9.2 mmHg, the calculated aortic valve area equals1.29 cm² by the continuity equation consistent with moderate aortic stenosis. Mild aortic valve regurgitation is seen.  Pulmonic Valve: The pulmonic valve was not well visualized. No indication of pulmonic valve regurgitation.  Aorta: The aortic rootis normal in size and structure.  Pulmonary Artery: The pulmonary artery is not well seen.  Venous: The inferior vena cava is not well visualized.      Summary:   1. Left ventricular ejection fraction, by visual estimation, is 50 to 55%.   2. Technically fair study.   3. Spectral Doppler shows impaired relaxation pattern of left ventricular myocardial filling (Grade I diastolic dysfunction).   4. There is no evidence of pericardial effusion.   5. Mild to moderate mitral valve regurgitation.   6. Mild aortic regurgitation.   7. Peak transaortic gradient equals 19.7 mmHg, mean transaortic gradient equals 9.2 mmHg, the calculated aortic valve area equals 1.29 cm² by the continuity equation consistent with moderate aortic stenosis.    MD Margareth Electronically signed on 8/30/2020 at 2:40:18 PM    < end of copied text >    [ ] Stress Test:  Pending  OTHER: 	      LABS:	 	  CARDIAC MARKERS ( 31 Aug 2020 05:07 )  x     / <0.01 ng/mL / x     / x     / x      p-BNP 31 Aug 2020 05:07: x    , CARDIAC MARKERS ( 30 Aug 2020 15:36 )  x     / <0.01 ng/mL / x     / x     / x      p-BNP 30 Aug 2020 15:36: x    , CARDIAC MARKERS ( 30 Aug 2020 09:28 )  x     / <0.01 ng/mL / x     / x     / x      p-BNP 30 Aug 2020 09:28: 647 pg/mL                          13.3   7.29  )-----------( 143      ( 30 Aug 2020 09:28 )             39.2     08-30    139  |  106  |  20.0  ----------------------------<  120<H>  4.0   |  22.0  |  0.96    Ca    8.9      30 Aug 2020 09:28  Mg     1.6     08-30    TPro  6.2<L>  /  Alb  3.8  /  TBili  0.6  /  DBili  x   /  AST  16  /  ALT  10  /  AlkPhos  59  08-30    proBNP: Serum Pro-Brain Natriuretic Peptide: 647 pg/mL (08-30 @ 09:28)    Lipid Profile:   HgA1c:   TSH:       TELEMETRY: Reviewed  SR, SB, AIVR, PVCs

## 2020-08-31 NOTE — ED CDU PROVIDER SUBSEQUENT DAY NOTE - ATTENDING CONTRIBUTION TO CARE
Mehran RODRIGUEZ- 79 Y/O M with h/o stent and mI , CAD p/w chest pain with wen and placed in cdu for stress test    pt went to stress test, vs stable and no events overnight    f/u stress test

## 2020-08-31 NOTE — H&P ADULT - NSCORESITESY/N_GEN_A_CORE_RD
Ashley Rangel, BORISN, RN   Amber Aguero, PA-C  [phone]  
No

## 2020-08-31 NOTE — H&P ADULT - ASSESSMENT
80M with a history of coronary artery disease, hypertension, TIA, BPH, Gout, and anxiety who presented with shortness or breath and chest discomfort with exertion noted to have an abnormal stress test.    Acute coronary syndrome - Cardiology consultation noted. Recommendation was for cardiac catheterization. On aspirin. Pending CT angiogram of the chest.    Hypertension - Close blood pressure monitoring. On carvedilol and isosorbide.    Gout - On allopurinol. No symptoms of flare on examination.    BPH - On tamsulosin. Monitoring for urinary symptoms.    Anxiety - On escitalopram with alprazolam as needed. 80M with a history of coronary artery disease, hypertension, TIA, BPH, Gout, and anxiety who presented with shortness or breath and chest discomfort with exertion noted to have an abnormal stress test.    Acute coronary syndrome - Cardiology consultation noted. Echocardiogram noted preserved left ventricular systolic function with moderate aortic stenosis. Recommendation was for cardiac catheterization. On aspirin. Pending CT angiogram of the chest.    Hypertension - Close blood pressure monitoring. On carvedilol and isosorbide.    Gout - On allopurinol. No symptoms of flare on examination.    BPH - On tamsulosin. Monitoring for urinary symptoms.    Anxiety - On escitalopram with alprazolam as needed.

## 2020-08-31 NOTE — ED CDU PROVIDER DISPOSITION NOTE - ATTENDING CONTRIBUTION TO CARE
Mehran RODRIGUEZ- 81 Y/O M with h/o stent and mI , CAD p/w chest pain with wen and placed in cdu for stress test    pt went to stress test, vs stable and no events overnight

## 2020-08-31 NOTE — H&P ADULT - HISTORY OF PRESENT ILLNESS
80M who presented to the hospital yesterday with dyspnea and chest discomfort. The patient reported that he had intermittent episodes of dyspnea with exertion over the past few months which worsened over the past few weeks. Most recently, the symptoms occurred with minimal exertion. He reported that the symptoms would be relieved with rest after 45 minutes. He had also endorsed that the symptoms had also improved with alprazolam. He reported a prior history of dyspnea and chest pain many years prior which resulted in cardiac stent placement. [place] [facility] [place] of [place] without any significant findings. The patient was placed under observation and evaluated by Cardiology. A nuclear stress test was obtained and noted wall motion abnormalities. The patient was thought to require further ischemic evaluation and admission to the hospital was requested. The patient also admits to occasional palpitations. He denied any recurrent episodes of syncope since [place] [facility] [place]

## 2020-09-01 LAB
ANION GAP SERPL CALC-SCNC: 12 MMOL/L — SIGNIFICANT CHANGE UP (ref 5–17)
BUN SERPL-MCNC: 19 MG/DL — SIGNIFICANT CHANGE UP (ref 8–20)
CALCIUM SERPL-MCNC: 9.3 MG/DL — SIGNIFICANT CHANGE UP (ref 8.6–10.2)
CHLORIDE SERPL-SCNC: 104 MMOL/L — SIGNIFICANT CHANGE UP (ref 98–107)
CO2 SERPL-SCNC: 24 MMOL/L — SIGNIFICANT CHANGE UP (ref 22–29)
CREAT SERPL-MCNC: 1.07 MG/DL — SIGNIFICANT CHANGE UP (ref 0.5–1.3)
GLUCOSE SERPL-MCNC: 109 MG/DL — HIGH (ref 70–99)
HCT VFR BLD CALC: 41.6 % — SIGNIFICANT CHANGE UP (ref 39–50)
HGB BLD-MCNC: 13.9 G/DL — SIGNIFICANT CHANGE UP (ref 13–17)
MCHC RBC-ENTMCNC: 33 PG — SIGNIFICANT CHANGE UP (ref 27–34)
MCHC RBC-ENTMCNC: 33.4 GM/DL — SIGNIFICANT CHANGE UP (ref 32–36)
MCV RBC AUTO: 98.8 FL — SIGNIFICANT CHANGE UP (ref 80–100)
PLATELET # BLD AUTO: 143 K/UL — LOW (ref 150–400)
POTASSIUM SERPL-MCNC: 4.4 MMOL/L — SIGNIFICANT CHANGE UP (ref 3.5–5.3)
POTASSIUM SERPL-SCNC: 4.4 MMOL/L — SIGNIFICANT CHANGE UP (ref 3.5–5.3)
RBC # BLD: 4.21 M/UL — SIGNIFICANT CHANGE UP (ref 4.2–5.8)
RBC # FLD: 13.6 % — SIGNIFICANT CHANGE UP (ref 10.3–14.5)
SODIUM SERPL-SCNC: 140 MMOL/L — SIGNIFICANT CHANGE UP (ref 135–145)
WBC # BLD: 6.61 K/UL — SIGNIFICANT CHANGE UP (ref 3.8–10.5)
WBC # FLD AUTO: 6.61 K/UL — SIGNIFICANT CHANGE UP (ref 3.8–10.5)

## 2020-09-01 PROCEDURE — 99233 SBSQ HOSP IP/OBS HIGH 50: CPT

## 2020-09-01 PROCEDURE — 93458 L HRT ARTERY/VENTRICLE ANGIO: CPT | Mod: 26

## 2020-09-01 PROCEDURE — 99152 MOD SED SAME PHYS/QHP 5/>YRS: CPT

## 2020-09-01 PROCEDURE — 99232 SBSQ HOSP IP/OBS MODERATE 35: CPT

## 2020-09-01 RX ORDER — CARVEDILOL PHOSPHATE 80 MG/1
1 CAPSULE, EXTENDED RELEASE ORAL
Qty: 0 | Refills: 0 | DISCHARGE

## 2020-09-01 RX ORDER — FUROSEMIDE 40 MG
20 TABLET ORAL DAILY
Refills: 0 | Status: DISCONTINUED | OUTPATIENT
Start: 2020-09-02 | End: 2020-09-03

## 2020-09-01 RX ORDER — GABAPENTIN 400 MG/1
0 CAPSULE ORAL
Qty: 0 | Refills: 0 | DISCHARGE

## 2020-09-01 RX ORDER — ESCITALOPRAM OXALATE 10 MG/1
1 TABLET, FILM COATED ORAL
Qty: 0 | Refills: 0 | DISCHARGE

## 2020-09-01 RX ORDER — TAMSULOSIN HYDROCHLORIDE 0.4 MG/1
1 CAPSULE ORAL
Qty: 0 | Refills: 0 | DISCHARGE

## 2020-09-01 RX ORDER — ALLOPURINOL 300 MG
1 TABLET ORAL
Qty: 0 | Refills: 0 | DISCHARGE

## 2020-09-01 RX ORDER — LISINOPRIL 2.5 MG/1
2.5 TABLET ORAL DAILY
Refills: 0 | Status: DISCONTINUED | OUTPATIENT
Start: 2020-09-01 | End: 2020-09-03

## 2020-09-01 RX ORDER — CARISOPRODOL 250 MG
1 TABLET ORAL
Qty: 0 | Refills: 0 | DISCHARGE

## 2020-09-01 RX ORDER — FUROSEMIDE 40 MG
40 TABLET ORAL ONCE
Refills: 0 | Status: COMPLETED | OUTPATIENT
Start: 2020-09-01 | End: 2020-09-01

## 2020-09-01 RX ORDER — ISOSORBIDE MONONITRATE 60 MG/1
1 TABLET, EXTENDED RELEASE ORAL
Qty: 0 | Refills: 0 | DISCHARGE

## 2020-09-01 RX ADMIN — GABAPENTIN 300 MILLIGRAM(S): 400 CAPSULE ORAL at 21:39

## 2020-09-01 RX ADMIN — GABAPENTIN 300 MILLIGRAM(S): 400 CAPSULE ORAL at 06:24

## 2020-09-01 RX ADMIN — CARVEDILOL PHOSPHATE 12.5 MILLIGRAM(S): 80 CAPSULE, EXTENDED RELEASE ORAL at 17:53

## 2020-09-01 RX ADMIN — ESCITALOPRAM OXALATE 10 MILLIGRAM(S): 10 TABLET, FILM COATED ORAL at 13:23

## 2020-09-01 RX ADMIN — Medication 81 MILLIGRAM(S): at 07:12

## 2020-09-01 RX ADMIN — ISOSORBIDE MONONITRATE 30 MILLIGRAM(S): 60 TABLET, EXTENDED RELEASE ORAL at 12:21

## 2020-09-01 RX ADMIN — CARVEDILOL PHOSPHATE 12.5 MILLIGRAM(S): 80 CAPSULE, EXTENDED RELEASE ORAL at 06:24

## 2020-09-01 RX ADMIN — TAMSULOSIN HYDROCHLORIDE 0.4 MILLIGRAM(S): 0.4 CAPSULE ORAL at 21:54

## 2020-09-01 RX ADMIN — Medication 40 MILLIGRAM(S): at 13:56

## 2020-09-01 RX ADMIN — Medication 0.5 MILLIGRAM(S): at 17:53

## 2020-09-01 RX ADMIN — Medication 100 MILLIGRAM(S): at 17:53

## 2020-09-01 NOTE — PROGRESS NOTE ADULT - SUBJECTIVE AND OBJECTIVE BOX
Interventional Cardiology NP Precath Note  Pt presents for cardiac cath today   Reports no chest pain or SOB at present      Mallampati 2  ASA 2  BRA 2.3  GFR     ALL: NKDA, NKFA. Denies shellfish/Contrast dye allergy.  PAST MEDICAL & SURGICAL HISTORY:  BPH (benign prostatic hyperplasia) Gout  TIA (transient ischemic attack)  NSTEMI (non-ST elevated myocardial infarction): 2009  ST elevation myocardial infarction (STEMI), unspecified artery: 2002, s/p stent x1  Hypertension, unspecified type  H/O cervical spine surgery  H/O hernia repair  S/P appendectomy  S/P cholecystectomy  H/O cardiac catheterization      MEDS:   allopurinol 100 milliGRAM(s) Oral daily  ALPRAZolam 0.5 milliGRAM(s) Oral two times a day  aspirin enteric coated 81 milliGRAM(s) Oral daily  carvedilol 12.5 milliGRAM(s) Oral every 12 hours  escitalopram 10 milliGRAM(s) Oral daily  gabapentin 300 milliGRAM(s) Oral three times a day  isosorbide   mononitrate ER Tablet (IMDUR) 30 milliGRAM(s) Oral daily  tamsulosin 0.4 milliGRAM(s) Oral at bedtime  temazepam 15 milliGRAM(s) Oral at bedtime PRN    Vitals  T(C): 36.2 (09-01-20 @ 06:25), Max: 36.5 (08-31-20 @ 19:50)  HR: 71 (09-01-20 @ 06:25) (57 - 85)  BP: 125/69 (09-01-20 @ 06:25) (120/80 - 138/72)  RR: 18 (09-01-20 @ 06:25) (18 - 20)  SpO2: 97% (09-01-20 @ 06:25) (96% - 97%)    Exam   NEURO: A & O x 3, no focal neurologic deficits  HEENT: NCAT, EOMI, PERRLA  NECK: No JVD, No carotid bruits B/L, +2 Carotid pulses B/L  PULM:  decreased bilaterally upper clear   CARD: RRR, +S1, +S2,   ABD: ND, +BS, NT, no masses  EXT: Warm, pedal edema yes/no, pitting/non-pitting       TTE Echo Complete w/ Contrast w/ Doppler (08.30.20   Summary:   1. Left ventricular ejection fraction, by visual estimation, is 50 to 55%.   2. Technically fair study.   3. Spectral Doppler shows impaired relaxation pattern of left ventricular myocardial filling (Grade I diastolic dysfunction).   4. There is no evidence of pericardial effusion.   5. Mild to moderate mitral valve regurgitation.   6. Mild aortic regurgitation.   7. Peak transaortic gradient equals 19.7 mmHg, mean transaortic gradient equals 9.2 mmHg, the calculated aortic valve area equals 1.29 cm² by the continuity equation consistent with moderate aortic stenosis.    Nuclear Stress Test-Pharmacologic (08.31.20   IMPRESSIONS:Abnormal; Non-ischemic Study  Exercise stress deferred due to unclear reasons.  No cardiac symptoms. No ischemic ECG changes.  Scar in RCA territory.  Segmental wall motion abnormalities in RCA territory.  The left ventricle was normal in size.  Post-stress  resting myocardialperfusion gated SPECT imaging was  performed (LVEF = 44 %)  LV Diastolic dysfunction present.  RV dysfunction present.                            13.3   7.29  )-----------( 143      ( 30 Aug 2020 09:28 )             39.2     08-30    139  |  106  |  20.0  ----------------------------<  120<H>  4.0   |  22.0  |  0.96    Ca    8.9      30 Aug 2020 09:28  Mg     1.6     08-30    TPro  6.2<L>  /  Alb  3.8  /  TBili  0.6  /  DBili  x   /  AST  16  /  ALT  10  /  AlkPhos  59  08-30    CARDIAC MARKERS ( 31 Aug 2020 05:07 )  x     / <0.01 ng/mL / x     / x     / x      CARDIAC MARKERS ( 30 Aug 2020 15:36 )  x     / <0.01 ng/mL / x     / x     / x      CARDIAC MARKERS ( 30 Aug 2020 09:28 )  x     / <0.01 ng/mL / x     / x     / x Interventional Cardiology NP Precath Note  Pt presents for cardiac cath today   Reports no chest pain or SOB at present      Mallampati 2  ASA 2  BRA 2.3  GFR     ALL: NKDA, NKFA. Denies shellfish/Contrast dye allergy.  PAST MEDICAL & SURGICAL HISTORY:  BPH (benign prostatic hyperplasia) Gout  TIA (transient ischemic attack)  NSTEMI (non-ST elevated myocardial infarction): 2009  ST elevation myocardial infarction (STEMI), unspecified artery: 2002, s/p stent x1  Hypertension, unspecified type  H/O cervical spine surgery  H/O hernia repair  S/P appendectomy  S/P cholecystectomy  H/O cardiac catheterization      MEDS:   allopurinol 100 milliGRAM(s) Oral daily  ALPRAZolam 0.5 milliGRAM(s) Oral two times a day  aspirin enteric coated 81 milliGRAM(s) Oral daily  carvedilol 12.5 milliGRAM(s) Oral every 12 hours  escitalopram 10 milliGRAM(s) Oral daily  gabapentin 300 milliGRAM(s) Oral three times a day  isosorbide   mononitrate ER Tablet (IMDUR) 30 milliGRAM(s) Oral daily  tamsulosin 0.4 milliGRAM(s) Oral at bedtime  temazepam 15 milliGRAM(s) Oral at bedtime PRN    Vitals  T(C): 36.2 (09-01-20 @ 06:25), Max: 36.5 (08-31-20 @ 19:50)  HR: 71 (09-01-20 @ 06:25) (57 - 85)  BP: 125/69 (09-01-20 @ 06:25) (120/80 - 138/72)  RR: 18 (09-01-20 @ 06:25) (18 - 20)  SpO2: 97% (09-01-20 @ 06:25) (96% - 97%)    Exam   NEURO: A & O x 3, no focal neurologic deficits  HEENT: NCAT, EOMI, PERRLA  NECK: No JVD, No carotid bruits B/L, +2 Carotid pulses B/L  PULM:  decreased bilaterally upper clear   CARD: RRR, +S1, +S2,   ABD: ND, +BS, NT, no masses  EXT: Warm, pedal edema yes/no, pitting/non-pitting       TTE Echo Complete w/ Contrast w/ Doppler (08.30.20   Summary:   1. Left ventricular ejection fraction, by visual estimation, is 50 to 55%.   2. Technically fair study.   3. Spectral Doppler shows impaired relaxation pattern of left ventricular myocardial filling (Grade I diastolic dysfunction).   4. There is no evidence of pericardial effusion.   5. Mild to moderate mitral valve regurgitation.   6. Mild aortic regurgitation.   7. Peak transaortic gradient equals 19.7 mmHg, mean transaortic gradient equals 9.2 mmHg, the calculated aortic valve area equals 1.29 cm² by the continuity equation consistent with moderate aortic stenosis.    Nuclear Stress Test-Pharmacologic (08.31.20   Nuclear Stress Test-Pharmacologic (08.31.20   NUCLEAR FINDINGS:  Review of raw data shows: Diaphragmatic artifact.  The left ventricle was normal in size. There is a medium  sized, moderate defect in inferior wall that is fixed,  suggestive of Infarct with minimal residual viability.  ------------------------------------------------------------------------      GATED ANALYSIS:  Post-stress resting myocardial perfusion gated SPECT  imaging was performed (LVEF = 44 %)  The distal inferior, the mid inferoseptal, the mid  inferior, and the basal inferior walls are hypokinetic.  Diastolic dysfunction present.    IMPRESSIONS: Abnormal; Non-ischemic Study  Exercise stress deferred due to unclear reasons.  No cardiac symptoms. No ischemic ECG changes.  Scar in RCA territory.  Segmental wall motion abnormalities in RCA territory.  The left ventricle was normal in size.  Post-stress  resting myocardial perfusion gated SPECT imaging was  performed (LVEF = 44 %)  LV Diastolic dysfunction present.  RV dysfunction present.                            13.3   7.29  )-----------( 143      ( 30 Aug 2020 09:28 )             39.2     08-30    139  |  106  |  20.0  ----------------------------<  120<H>  4.0   |  22.0  |  0.96    Ca    8.9      30 Aug 2020 09:28  Mg     1.6     08-30    TPro  6.2<L>  /  Alb  3.8  /  TBili  0.6  /  DBili  x   /  AST  16  /  ALT  10  /  AlkPhos  59  08-30    CARDIAC MARKERS ( 31 Aug 2020 05:07 )  x     / <0.01 ng/mL / x     / x     / x      CARDIAC MARKERS ( 30 Aug 2020 15:36 )  x     / <0.01 ng/mL / x     / x     / x      CARDIAC MARKERS ( 30 Aug 2020 09:28 )  x     / <0.01 ng/mL / x     / x     / x

## 2020-09-01 NOTE — PROGRESS NOTE ADULT - SUBJECTIVE AND OBJECTIVE BOX
[facility]  ----------------------------------------  The patient was seen and evaluated for heart failure. Offers no complaints.    Vital Signs Last 24 Hrs  T(C): 36.2 (01 Sep 2020 06:25), Max: 36.5 (31 Aug 2020 19:50)  T(F): 97.1 (01 Sep 2020 06:25), Max: 97.7 (31 Aug 2020 19:50)  HR: 77 (01 Sep 2020 14:00) (49 - 85)  BP: 110/70 (01 Sep 2020 13:30) (108/57 - 138/72)  BP(mean): --  RR: 18 (01 Sep 2020 14:00) (18 - 20)  SpO2: 95% (01 Sep 2020 14:00) (93% - 97%)    PHYSICAL EXAMINATION:  ----------------------------------------  General appearance: No acute distress, Awake, Alert  HEENT: Normocephalic, Atraumatic, Conjunctiva clear, EOMI  Neck: Supple, No JVD, No tenderness  Lungs: Breath sound equal bilaterally, No wheezes, No rales  Cardiovascular: S1S2, Regular rhythm  Abdomen: Soft, Nontender, Nondistended, No guarding/rebound, Positive bowel sounds  Extremities: No clubbing, No cyanosis, No edema, No calf tenderness  Neuro: Strength equal bilaterally, No tremors  Psychiatric: Appropriate mood, Normal affect    LABORATORY STUDIES:  ----------------------------------------             13.9   6.61  )-----------( 143      ( 01 Sep 2020 12:40 )             41.6     09-01    140  |  104  |  19.0  ----------------------------<  109<H>  4.4   |  24.0  |  1.07    Ca    9.3      01 Sep 2020 12:40    CARDIAC MARKERS ( 31 Aug 2020 05:07 )  x     / <0.01 ng/mL / x     / x     / x      CARDIAC MARKERS ( 30 Aug 2020 15:36 )  x     / <0.01 ng/mL / x     / x     / x        Urinalysis Basic - ( 30 Aug 2020 15:20 )  Color: Yellow / Appearance: Clear / S.020 / pH: x  Gluc: x / Ketone: Negative  / Bili: Negative / Urobili: Negative mg/dL   Blood: x / Protein: Negative mg/dL / Nitrite: Negative   Leuk Esterase: Trace / RBC: Negative /HPF / WBC 0-2   Sq Epi: x / Non Sq Epi: x / Bacteria: x    MEDICATIONS  (STANDING):  allopurinol 100 milliGRAM(s) Oral daily  ALPRAZolam 0.5 milliGRAM(s) Oral two times a day  aspirin enteric coated 81 milliGRAM(s) Oral daily  carvedilol 12.5 milliGRAM(s) Oral every 12 hours  escitalopram 10 milliGRAM(s) Oral daily  gabapentin 300 milliGRAM(s) Oral three times a day  isosorbide   mononitrate ER Tablet (IMDUR) 30 milliGRAM(s) Oral daily  lisinopril 2.5 milliGRAM(s) Oral daily  tamsulosin 0.4 milliGRAM(s) Oral at bedtime    MEDICATIONS  (PRN):  temazepam 15 milliGRAM(s) Oral at bedtime PRN Insomnia      ASSESSMENT / PLAN:  ----------------------------------------  80M with a history of coronary artery disease, hypertension, TIA, BPH, Gout, and anxiety who presented with shortness or breath and chest discomfort with exertion noted to have an abnormal stress test.     CHF - On furosemide and lisinopril.    Coronary artery disease - CT angiogram was without pulmonary embolism. Cardiac catheterization was without significant obstructive disease.    Hypertension - Close blood pressure monitoring. On carvedilol and isosorbide.    Gout - On allopurinol. No symptoms of flare on examination.    BPH - On tamsulosin. Monitoring for urinary symptoms.    Anxiety - On escitalopram with alprazolam as needed.

## 2020-09-01 NOTE — PROGRESS NOTE ADULT - SUBJECTIVE AND OBJECTIVE BOX
Nurse Practitioner Progress note:   s/p C with successful PCI and ZION to      Patient feels well.  Denies chest pain, dizziness or palpitations at this time. Patient states he is still experiencing SOB/      Right radial hemoband in place.  Procedure site CDI, no bleeding, no hematoma, able to move all digits with capillary refill < 3 seconds, fingers warm      T(C): 36.2 (09-01-20 @ 06:25), Max: 36.5 (08-31-20 @ 19:50)  HR: 49 (09-01-20 @ 12:15) (49 - 85)  BP: 112/64 (09-01-20 @ 12:15) (112/64 - 138/72)  RR: 18 (09-01-20 @ 12:15) (18 - 20)  SpO2: 93% (09-01-20 @ 12:15) (93% - 97%)      CARDIAC MARKERS ( 31 Aug 2020 05:07 )  x     / <0.01 ng/mL / x     / x     / x      CARDIAC MARKERS ( 30 Aug 2020 15:36 )  x     / <0.01 ng/mL / x     / x     / x              MEDICATIONS  (STANDING):  allopurinol 100 milliGRAM(s) Oral daily  ALPRAZolam 0.5 milliGRAM(s) Oral two times a day  aspirin enteric coated 81 milliGRAM(s) Oral daily  carvedilol 12.5 milliGRAM(s) Oral every 12 hours  escitalopram 10 milliGRAM(s) Oral daily  furosemide   Injectable 40 milliGRAM(s) IV Push once  gabapentin 300 milliGRAM(s) Oral three times a day  isosorbide   mononitrate ER Tablet (IMDUR) 30 milliGRAM(s) Oral daily  lisinopril 2.5 milliGRAM(s) Oral daily  tamsulosin 0.4 milliGRAM(s) Oral at bedtime    MEDICATIONS  (PRN):  temazepam 15 milliGRAM(s) Oral at bedtime PRN Insomnia        HPI:  80M who presented to the hospital yesterday with dyspnea and chest discomfort. The patient reported that he had intermittent episodes of dyspnea with exertion over the past few months which worsened over the past few weeks. Most recently, the symptoms occurred with minimal exertion. He reported that the symptoms would be relieved with rest after 45 minutes. He had also endorsed that the symptoms had also improved with alprazolam. He reported a prior history of dyspnea and chest pain many years prior which resulted in cardiac stent placement. He was also [place] at [facility] [place] for an episode of syncope without any significant findings. The patient was placed under observation and evaluated by Cardiology. A nuclear stress test was obtained and noted wall motion abnormalities. The patient was thought to require further ischemic evaluation and admission to the hospital was requested. The patient also admits to occasional palpitations. He denied any recurrent episodes of syncope since discharge from [facility] [place]. (31 Aug 2020 15:20)    now s/p LHC  revealing moderate ISR of the previous ZION to the LAD ~ 30%,40% occlusion of the OM1, EF 40%; EDP 22    ASSESSMENT/PLAN:    No significant new coronary artery disease  CHF  -Admit to telemetry bed  -VS, labs, diet, activity as per PCI orders  -IV hydration  -Encourage PO fluids  -Aspirin 81 mg PO daily  -Lasix 40 mg IVP now  -Lasix 20 mg po daily starting 9/2/20  -Lisinopril 2.5 mg po daily  -Coreg 12.5  mg PO twice daily  -Stat labs pre Lasix IV push and again in the am to follow Lasix response  -Plan of care discussed with patient, family and MD  -likely d/c in AM if patient remains stable overnight  -NP to see in AM to evaluate labs, EKG and procedure site check  -Follow-up with attending Dr Bebe Myers as an outpatient  within 1 week  -Discussed therapeutic lifestyle changes to reduce risk factors such as following a cardiac diet, weight loss, maintaining a healthy weight, exercise, smoking cessation, medication compliance, and regular follow-up  with MD to know your numbers (BP, cholesterol, weight, and glucose) Nurse Practitioner Progress note:   s/p [facility]       Patient feels well.  Denies chest pain, dizziness or palpitations at this time. Patient states he is still experiencing SOB      Right radial hemoband in place.  Procedure site CDI, no bleeding, no hematoma, able to move all digits with capillary refill < 3 seconds, fingers warm      T(C): 36.2 (09-01-20 @ 06:25), Max: 36.5 (08-31-20 @ 19:50)  HR: 49 (09-01-20 @ 12:15) (49 - 85)  BP: 112/64 (09-01-20 @ 12:15) (112/64 - 138/72)  RR: 18 (09-01-20 @ 12:15) (18 - 20)  SpO2: 93% (09-01-20 @ 12:15) (93% - 97%)      CARDIAC MARKERS ( 31 Aug 2020 05:07 )  x     / <0.01 ng/mL / x     / x     / x      CARDIAC MARKERS ( 30 Aug 2020 15:36 )  x     / <0.01 ng/mL / x     / x     / x              MEDICATIONS  (STANDING):  allopurinol 100 milliGRAM(s) Oral daily  ALPRAZolam 0.5 milliGRAM(s) Oral two times a day  aspirin enteric coated 81 milliGRAM(s) Oral daily  carvedilol 12.5 milliGRAM(s) Oral every 12 hours  escitalopram 10 milliGRAM(s) Oral daily  furosemide   Injectable 40 milliGRAM(s) IV Push once  gabapentin 300 milliGRAM(s) Oral three times a day  isosorbide   mononitrate ER Tablet (IMDUR) 30 milliGRAM(s) Oral daily  lisinopril 2.5 milliGRAM(s) Oral daily  tamsulosin 0.4 milliGRAM(s) Oral at bedtime    MEDICATIONS  (PRN):  temazepam 15 milliGRAM(s) Oral at bedtime PRN Insomnia        HPI:  80M who presented to the hospital yesterday with dyspnea and chest discomfort. The patient reported that he had intermittent episodes of dyspnea with exertion over the past few months which worsened over the past few weeks. Most recently, the symptoms occurred with minimal exertion. He reported that the symptoms would be relieved with rest after 45 minutes. He had also endorsed that the symptoms had also improved with alprazolam. He reported a prior history of dyspnea and chest pain many years prior which resulted in cardiac stent placement. [place] was also [place] [facility] [place] of [place]ope without any significant findings. The patient was placed under observation and evaluated by Cardiology. A nuclear stress test was obtained and noted wall motion abnormalities. The patient was thought to require further ischemic evaluation and admission to the hospital was requested. The patient also admits to occasional palpitations. He denied any recurrent episodes of syncope since [place] [facility] [place]. (31 Aug 2020 15:20)    now s/p LHC  revealing moderate ISR of the previous ZION to the LAD ~ 30%,40% occlusion of the OM1, EF 40%; EDP 22    ASSESSMENT/PLAN:    No significant new coronary artery disease  CHF  -Admit to telemetry bed  -VS, labs, diet, activity as per PCI orders  -IV hydration  -Encourage PO fluids  -Aspirin 81 mg PO daily  -Lasix 40 mg IVP now  -Lasix 20 mg po daily starting 9/2/20  -Lisinopril 2.5 mg po daily  -Coreg 12.5  mg PO twice daily  -Stat labs pre Lasix IV push and again in the am to follow Lasix response  -Plan of care discussed with patient, family and MD  -likely d/c in AM if patient remains stable overnight  -NP to see in AM to evaluate labs, EKG and procedure site check-  -POC D/W Dr Fitzgerald and Lucia  -Follow-up with attending Dr Bebe Myers as an outpatient  within 1 week  - Restricted use with no heavy lifting of affected arm for 48 hours.  No submerging the arm in water for 48 hours.  Patient may start showering today.  Call your doctor for any bleeding, swelling, loss of sensation in the hand or fingers, or fingers turning blue.  If heavy bleeding or large lumps form, hold pressure and call cardiology.  -Discussed therapeutic lifestyle changes to reduce risk factors such as following a cardiac diet, weight loss, maintaining a healthy weight, exercise, smoking cessation, medication compliance, and regular follow-up  with MD to know your numbers (BP, cholesterol, weight, and glucose)

## 2020-09-01 NOTE — PROGRESS NOTE ADULT - ASSESSMENT
81 y/o M PMH MI 2002, 2009, PCI x 1, TIA 2010, HTN, BPH presents to ED with c/o difficulty breathing x 3 weeks. Breathing worse with exertion, improved with rest, alprazolam. Also endorses palpitations, lightheaded, dizziness. States hospitalized in July for syncope[place]difficulty breathing[place] [facility] [place]  Cardiac cath 2018- normal . Pt was sent for NST segmental wall motion abnormalities in RCA with a EF 44%  and echo which revealed EF 50 % with moderate mitral and aortic valvular disease. His troponin was negative x three . ECG non-ischemic but shows sinus bradycardia ( on Coreg) .  He was referred for cardiac cath   Currently pain free and reports no SOB   PRE-PROCEDURE ASSESSMENT  [facility] -Patient seen and examined  -Labs and EKG reviewed   -Pre-procedure teaching completed with patient   -Informed consent obtained  -Questions answered  - Pt received Asprin prior to cardiac cath   Risks & benefits of procedure and sedation and risks and benefits for the alternative therapy have been explained to the patient in layman’s terms including but not limited to: allergic reaction, bleeding, infection, arrhythmia, respiratory compromise, renal and vascular compromise, limb damage, MI, CVA, emergent CABG/Vascular Surgery and death. Informed consent obtained and in chart.

## 2020-09-01 NOTE — PROGRESS NOTE ADULT - ATTENDING COMMENTS
Patient was assessed by me, Dr. Myers at the bedside     79 y/o M PMH MI 2002, 2009, PCI x 1, TIA 2010, HTN, BPH presents to ED with c/o difficulty breathing x 3 weeks. Breathing worse with exertion, improved with rest, alprazolam. Also endorses palpitations, lightheaded, dizziness. States hospitalized in July for syncope/difficulty breathing[place] [facility] [place]  Cardiac cath 2018- normal . Pt was sent for NST segmental wall motion abnormalities in RCA with a EF 44%  and echo which revealed EF 50 % with moderate mitral regurgitation and moderate AS.  CTA of the pulmonary arteries shows no evidence of pulmonary embolism   Cardiac cath done shows non-obstructive disease with mild ISR (in-stent restenosis) and LVEF 40-45% on LV gram     Physical Examination   BP: 125/69  HR: 71 bpm RR: 20   General: NAD AAOX3   HEENT: EOMI/PERRLA   Cardiac: + S1S2 RRR  Chest: CTA b/l   Abdomen: + BS soft NT/ND   Extremities: no edema     A/P  1) CAD s/p PCI; 2009  - Cardiac cath shows mild non-obstructive disease with mild ISR / Echo with normal EF, grade I DD, mild to mod MR, and mod AS  - troponin neg x1 and proBNP 647  - Continue with ASA 81mg po q daily and statin   - based on LVEF 40-45 % will start Lisinopril 2.5mg po q daily and Lasix 20mg po q daily   - c/w coreg 12.5mg po q 12hrs     HTN  - normotensive   - cont home medication regimen     2) Sinus Bradycardia w/ no evidence of chronotropic incompetence   - on Coreg 12.5mg po BID (will uptitrate based on BP); no evidence of chronotropic incompetence   - Patient with a few beats of AIVR, and having frequent PVCs.   - Ambulated patient with good chronotropic response.     3) DVT ppx: start Lovenox 40mg sq     Monitor for one more night and obtain labs today     Thank You   Bebe Myers D.O.   Cardiology Attending   #[phone] Patient was assessed by me, Dr. Myers at the bedside     81 y/o M PMH MI 2002, 2009, PCI x 1, TIA 2010, HTN, BPH presents to ED with c/o difficulty breathing x 3 weeks. Breathing worse with exertion, improved with rest, alprazolam. Also endorses palpitations, lightheaded, dizziness. States hospitalized in July for syncope/difficulty breathing, seen at [facility] [place].  Cardiac cath 2018- normal . Pt was sent for NST segmental wall motion abnormalities in RCA with a EF 44%  and echo which revealed EF 50 % with moderate mitral regurgitation and moderate AS.  CTA of the pulmonary arteries shows no evidence of pulmonary embolism   Cardiac cath done shows non-obstructive disease with mild ISR (in-stent restenosis) and LVEF 40-45% on LV gram     Physical Examination   BP: 125/69  HR: 71 bpm RR: 20   General: NAD AAOX3   HEENT: EOMI/PERRLA   Cardiac: + S1S2 RRR  Chest: CTA b/l   Abdomen: + BS soft NT/ND   Extremities: no edema     A/P  1) CAD s/p PCI; 2009  - Cardiac cath shows mild non-obstructive disease with mild ISR / Echo with normal EF, grade I DD, mild to mod MR, and mod AS  - troponin neg x1 and proBNP 647  - Continue with ASA 81mg po q daily and statin   - based on LVEF 40-45 % will start Lisinopril 2.5mg po q daily and Lasix 40mg IVP X1 and tomorrow start Lasix 20mg po q daily   - c/w coreg 12.5mg po q 12hrs     HTN  - normotensive   - cont home medication regimen     2) Sinus Bradycardia w/ no evidence of chronotropic incompetence   - on Coreg 12.5mg po BID (will uptitrate based on BP); no evidence of chronotropic incompetence   - Patient with a few beats of AIVR, and having frequent PVCs.   - Ambulated patient with good chronotropic response.     3) DVT ppx: start Lovenox 40mg sq     Monitor for one more night and obtain labs today     Thank You   Bebe Myers D.O.   Cardiology Attending   #[phone] Patient was assessed by me, Dr. Myers at the bedside     81 y/o M PMH MI 2002, 2009, PCI x 1, TIA 2010, HTN, BPH presents to ED with c/o difficulty breathing x 3 weeks. Breathing worse with exertion, improved with rest, alprazolam. Also endorses palpitations, lightheaded, dizziness. States hospitalized in July for syncope/difficulty breathing, seen at [facility] [place].  Cardiac cath 2018- normal . Pt was sent for NST segmental wall motion abnormalities in RCA with a EF 44%  and echo which revealed EF 50 % with moderate mitral regurgitation and moderate AS.  CTA of the pulmonary arteries shows no evidence of pulmonary embolism   Cardiac cath done shows non-obstructive disease with mild ISR (in-stent restenosis) and LVEF 40-45% on LV gram     Physical Examination   BP: 125/69  HR: 71 bpm RR: 20   General: NAD AAOX3   HEENT: EOMI/PERRLA   Cardiac: + S1S2 RRR  Chest: CTA b/l   Abdomen: + BS soft NT/ND   Extremities: no edema     A/P  1) CAD s/p PCI; 2009  - Cardiac cath shows mild non-obstructive disease with mild ISR / Echo with normal EF, grade I DD, mild to mod MR, and mod AS  - troponin neg x1 and proBNP 647  - Continue with ASA 81mg po q daily and statin   - based on LVEF 40-45 % will start Lisinopril 2.5mg po q daily and Lasix 40mg IVP X1 and tomorrow start Lasix 20mg po q daily   - c/w coreg 12.5mg po q 12hrs     2) Acute HFrEF (EF 44%), NYHA class 2 / stage c  - patient is mildly volume overloaded with LVEDP 25 mmHg   - give Lasix 40mg IVP x 1 and then start Lasix 20mg po q daily   - start Lisinopril 2.5mg po q daily and continue with Coreg 12.5mg po q 12 hrs   - monitor I/O   - tele monitor   - reassess volume status in the AM     3) HTN  - normotensive   - cont home medication regimen     4) Sinus Bradycardia w/ no evidence of chronotropic incompetence   - on Coreg 12.5mg po BID (will uptitrate based on BP); no evidence of chronotropic incompetence   - Patient with a few beats of AIVR, and having frequent PVCs.   - Ambulated patient with good chronotropic response.     5) DVT ppx: start Lovenox 40mg sq     Monitor for one more night and obtain labs today     Thank You   Bebe Myers D.O.   Cardiology Attending   #[phone]

## 2020-09-02 LAB
ALBUMIN SERPL ELPH-MCNC: 3.6 G/DL — SIGNIFICANT CHANGE UP (ref 3.3–5.2)
ALP SERPL-CCNC: 60 U/L — SIGNIFICANT CHANGE UP (ref 40–120)
ALT FLD-CCNC: 12 U/L — SIGNIFICANT CHANGE UP
ANION GAP SERPL CALC-SCNC: 12 MMOL/L — SIGNIFICANT CHANGE UP (ref 5–17)
AST SERPL-CCNC: 15 U/L — SIGNIFICANT CHANGE UP
BASOPHILS # BLD AUTO: 0.03 K/UL — SIGNIFICANT CHANGE UP (ref 0–0.2)
BASOPHILS NFR BLD AUTO: 0.4 % — SIGNIFICANT CHANGE UP (ref 0–2)
BILIRUB SERPL-MCNC: 0.4 MG/DL — SIGNIFICANT CHANGE UP (ref 0.4–2)
BUN SERPL-MCNC: 29 MG/DL — HIGH (ref 8–20)
CALCIUM SERPL-MCNC: 9.2 MG/DL — SIGNIFICANT CHANGE UP (ref 8.6–10.2)
CHLORIDE SERPL-SCNC: 104 MMOL/L — SIGNIFICANT CHANGE UP (ref 98–107)
CO2 SERPL-SCNC: 22 MMOL/L — SIGNIFICANT CHANGE UP (ref 22–29)
CREAT SERPL-MCNC: 1.29 MG/DL — SIGNIFICANT CHANGE UP (ref 0.5–1.3)
EOSINOPHIL # BLD AUTO: 0.28 K/UL — SIGNIFICANT CHANGE UP (ref 0–0.5)
EOSINOPHIL NFR BLD AUTO: 3.6 % — SIGNIFICANT CHANGE UP (ref 0–6)
GLUCOSE SERPL-MCNC: 107 MG/DL — HIGH (ref 70–99)
HCT VFR BLD CALC: 37.9 % — LOW (ref 39–50)
HGB BLD-MCNC: 12.4 G/DL — LOW (ref 13–17)
IMM GRANULOCYTES NFR BLD AUTO: 0.6 % — SIGNIFICANT CHANGE UP (ref 0–1.5)
LYMPHOCYTES # BLD AUTO: 1.6 K/UL — SIGNIFICANT CHANGE UP (ref 1–3.3)
LYMPHOCYTES # BLD AUTO: 20.5 % — SIGNIFICANT CHANGE UP (ref 13–44)
MAGNESIUM SERPL-MCNC: 2.3 MG/DL — SIGNIFICANT CHANGE UP (ref 1.6–2.6)
MCHC RBC-ENTMCNC: 32.5 PG — SIGNIFICANT CHANGE UP (ref 27–34)
MCHC RBC-ENTMCNC: 32.7 GM/DL — SIGNIFICANT CHANGE UP (ref 32–36)
MCV RBC AUTO: 99.5 FL — SIGNIFICANT CHANGE UP (ref 80–100)
MONOCYTES # BLD AUTO: 0.9 K/UL — SIGNIFICANT CHANGE UP (ref 0–0.9)
MONOCYTES NFR BLD AUTO: 11.5 % — SIGNIFICANT CHANGE UP (ref 2–14)
NEUTROPHILS # BLD AUTO: 4.95 K/UL — SIGNIFICANT CHANGE UP (ref 1.8–7.4)
NEUTROPHILS NFR BLD AUTO: 63.4 % — SIGNIFICANT CHANGE UP (ref 43–77)
PLATELET # BLD AUTO: 144 K/UL — LOW (ref 150–400)
POTASSIUM SERPL-MCNC: 3.9 MMOL/L — SIGNIFICANT CHANGE UP (ref 3.5–5.3)
POTASSIUM SERPL-SCNC: 3.9 MMOL/L — SIGNIFICANT CHANGE UP (ref 3.5–5.3)
PROT SERPL-MCNC: 6 G/DL — LOW (ref 6.6–8.7)
RBC # BLD: 3.81 M/UL — LOW (ref 4.2–5.8)
RBC # FLD: 13.7 % — SIGNIFICANT CHANGE UP (ref 10.3–14.5)
SODIUM SERPL-SCNC: 138 MMOL/L — SIGNIFICANT CHANGE UP (ref 135–145)
WBC # BLD: 7.81 K/UL — SIGNIFICANT CHANGE UP (ref 3.8–10.5)
WBC # FLD AUTO: 7.81 K/UL — SIGNIFICANT CHANGE UP (ref 3.8–10.5)

## 2020-09-02 PROCEDURE — 99232 SBSQ HOSP IP/OBS MODERATE 35: CPT

## 2020-09-02 PROCEDURE — 93010 ELECTROCARDIOGRAM REPORT: CPT

## 2020-09-02 RX ORDER — MAGNESIUM SULFATE 500 MG/ML
2 VIAL (ML) INJECTION ONCE
Refills: 0 | Status: COMPLETED | OUTPATIENT
Start: 2020-09-02 | End: 2020-09-02

## 2020-09-02 RX ORDER — ENOXAPARIN SODIUM 100 MG/ML
40 INJECTION SUBCUTANEOUS DAILY
Refills: 0 | Status: DISCONTINUED | OUTPATIENT
Start: 2020-09-02 | End: 2020-09-03

## 2020-09-02 RX ADMIN — Medication 0.5 MILLIGRAM(S): at 21:43

## 2020-09-02 RX ADMIN — ISOSORBIDE MONONITRATE 30 MILLIGRAM(S): 60 TABLET, EXTENDED RELEASE ORAL at 13:08

## 2020-09-02 RX ADMIN — Medication 81 MILLIGRAM(S): at 08:57

## 2020-09-02 RX ADMIN — Medication 100 MILLIGRAM(S): at 08:57

## 2020-09-02 RX ADMIN — TAMSULOSIN HYDROCHLORIDE 0.4 MILLIGRAM(S): 0.4 CAPSULE ORAL at 21:44

## 2020-09-02 RX ADMIN — ESCITALOPRAM OXALATE 10 MILLIGRAM(S): 10 TABLET, FILM COATED ORAL at 08:57

## 2020-09-02 RX ADMIN — Medication 0.5 MILLIGRAM(S): at 06:22

## 2020-09-02 RX ADMIN — LISINOPRIL 2.5 MILLIGRAM(S): 2.5 TABLET ORAL at 09:00

## 2020-09-02 RX ADMIN — Medication 20 MILLIGRAM(S): at 06:22

## 2020-09-02 RX ADMIN — Medication 50 GRAM(S): at 02:08

## 2020-09-02 RX ADMIN — GABAPENTIN 300 MILLIGRAM(S): 400 CAPSULE ORAL at 21:43

## 2020-09-02 RX ADMIN — GABAPENTIN 300 MILLIGRAM(S): 400 CAPSULE ORAL at 06:22

## 2020-09-02 RX ADMIN — GABAPENTIN 300 MILLIGRAM(S): 400 CAPSULE ORAL at 13:08

## 2020-09-02 NOTE — PROGRESS NOTE ADULT - ASSESSMENT
Assessment and Plan   79 y/o M PMH MI 2002, 2009, PCI x 1, TIA 2010, HTN, BPH presents to ED with c/o difficulty breathing x 3 weeks. Breathing worse with exertion, improved with rest, alprazolam. Also endorses palpitations, lightheaded, dizziness. States hospitalized in July for syncope/difficulty breathing, [place] [facility] [place]  Cardiac cath 2018- normal . Pt was sent for NST segmental wall motion abnormalities in RCA with a EF 44%  and echo which revealed EF 50 % with moderate mitral regurgitation and moderate AS.  CTA of the pulmonary arteries shows no evidence of pulmonary embolism   Cardiac cath done shows non-obstructive disease with mild ISR (in-stent restenosis) and LVEF 40-45% on LV gram     A/P  1) CAD s/p PCI; 2009  - Cardiac cath shows mild non-obstructive disease with mild ISR / Echo with normal EF, grade I DD, mild to mod MR, and mod AS  - troponin neg x1 and proBNP 647  - Continue with ASA 81mg po q daily and statin   - Lipid Panel in AM   - c/w coreg 12.5mg po q 12hrs     2) Acute HFrEF (EF 44%), NYHA class 2 / stage C  - euvolemic on physical examination with interval improvement in shortness of breath  - mild interval increase in Cr to 1.29  - c/w Lasix 20mg po q daily, Lisinopril 2.5mg po q daily and Coreg 12.5mg po q 12 hrs   - monitor I/O   - c/w tele monitor   - OOB to chair today   - will keep one more night and possible D/C on 9/3/2020    3) HTN  - normotensive   - cont home medication regimen   - if has episodes of hypotension consider d/c Imdur     4) Sinus Bradycardia w/ no evidence of chronotropic incompetence   - on Coreg 12.5mg po BID (will uptitrate based on BP); no evidence of chronotropic incompetence   - Patient with a few beats of AIVR, and having frequent PVCs.   - Ambulated patient with good chronotropic response.     5) DVT ppx: on Lovenox 40mg sq     Thank You   Bebe Myers D.O.   Cardiology Attending   #[phone] .

## 2020-09-02 NOTE — PROGRESS NOTE ADULT - SUBJECTIVE AND OBJECTIVE BOX
[place] CARDIOLOGY-[facility]                                                               Office: [address] [address]                                                              Telephone: [phone]. Fax:[phone]                                                                             PROGRESS NOTE  Reason for follow up: Shortness of breath with Acute decompensated HFrEf (EF 40-45% )  Overnight: Patient was sinus bradycardic overnight with no complaints of chest pain or shortness of breath; ambulated post procedure and noted to have interval improvement in shortness of breath   This AM found sitting up in bed eating breakfast   R radial post LHC was intact with no hematoma and + radial pulse palpated   Update: Noted to have elevation in Cr due to diuresis     	  Vitals:  T(C): 36.6 (09-02-20 @ 08:54), Max: 36.7 (09-01-20 @ 21:38)  HR: 54 (09-02-20 @ 08:54) (49 - 80)  BP: 101/64 (09-02-20 @ 08:54) (101/64 - 128/80)  RR: 17 (09-02-20 @ 08:54) (17 - 18)  SpO2: 97% (09-02-20 @ 08:54) (93% - 98%)  Wt(kg): --  I&O's Summary    01 Sep 2020 07:01  -  02 Sep 2020 07:00  --------------------------------------------------------  IN: 760 mL / OUT: 900 mL / NET: -140 mL      Weight (kg): 97.5 (09-01 @ 06:25)      PHYSICAL EXAM:  Appearance: Comfortable. No acute distress  HEENT:  Head and neck: Atraumatic. Normocephalic.  Normal oral mucosa, PERRL, Neck is supple. No JVD, No carotid bruit.   Neurologic: A & O x 3, no focal deficits. EOMI.  Lymphatic: No cervical lymphadenopathy  Cardiovascular: Normal S1 S2, No murmur, rubs/gallops. No JVD, No edema  Respiratory: Lungs clear to auscultation  Gastrointestinal:  Soft, Non-tender, + BS  Lower Extremities: No edema  Psychiatry: Patient is calm. No agitation. Mood & affect appropriate  Skin: No rashes/ ecchymoses/cyanosis/ulcers visualized on the face, hands or feet.      CURRENT MEDICATIONS:  carvedilol 12.5 milliGRAM(s) Oral every 12 hours  furosemide    Tablet 20 milliGRAM(s) Oral daily  isosorbide   mononitrate ER Tablet (IMDUR) 30 milliGRAM(s) Oral daily  lisinopril 2.5 milliGRAM(s) Oral daily  tamsulosin 0.4 milliGRAM(s) Oral at bedtime    ALPRAZolam  escitalopram  gabapentin  allopurinol  aspirin enteric coated      DIAGNOSTIC TESTING:  [x ] Echocardiogram:    1. Left ventricular ejection fraction, by visual estimation, is 50 to 55%.   2. Technically fair study.   3. Spectral Doppler shows impaired relaxation pattern of left ventricular myocardial filling (Grade I diastolic dysfunction).   4. There is no evidence of pericardial effusion.   5. Mild to moderate mitral valve regurgitation.   6. Mild aortic regurgitation.   7. Peak transaortic gradient equals 19.7 mmHg, mean transaortic gradient equals 9.2 mmHg, the calculated aortic valve area equals 1.29 cm² by the continuity equation consistent with moderate aortic stenosis.    [ x]  Catheterization: moderate ISR of the previous ZION to the LAD ~ 30%,40% occlusion of the OM1, EF 40%; EDP 22    [x] Stress Test:  Nuclear Stress test (Pharmacologic)  Abnormal; Non-ischemic Study  Exercise stress deferred due to unclear reasons.  No cardiac symptoms. No ischemic ECG changes.  Scar in RCA territory.  Segmental wall motion abnormalities in RCA territory.  The left ventricle was normal in size.  Post-stress  resting myocardial perfusion gated SPECT imaging was  performed (LVEF = 44 %)  LV Diastolic dysfunction present.  RV dysfunction present.  OTHER: 	      LABS:	 	  CARDIAC MARKERS ( 31 Aug 2020 05:07 )  x     / <0.01 ng/mL / x     / x     / x      p-BNP 31 Aug 2020 05:07: x    , CARDIAC MARKERS ( 30 Aug 2020 15:36 )  x     / <0.01 ng/mL / x     / x     / x      p-BNP 30 Aug 2020 15:36: x                              12.4   7.81  )-----------( 144      ( 02 Sep 2020 06:03 )             37.9     09-02    138  |  104  |  29.0<H>  ----------------------------<  107<H>  3.9   |  22.0  |  1.29    Ca    9.2      02 Sep 2020 06:03  Mg     2.3     09-02    TPro  6.0<L>  /  Alb  3.6  /  TBili  0.4  /  DBili  x   /  AST  15  /  ALT  12  /  AlkPhos  60  09-02    proBNP: Serum Pro-Brain Natriuretic Peptide: 647 pg/mL (08-30 @ 09:28)    Lipid Profile:   HgA1c:   TSH: Thyroid Stimulating Hormone, Serum: 3.13 uIU/mL        TELEMETRY: Reviewed and noted to have PVCs and sinus bradycardia   ECG:  Reviewed by me. Sinus bradycardia @ 53 bpm with first degree AV block and interventricular conduction delay

## 2020-09-02 NOTE — PROGRESS NOTE ADULT - SUBJECTIVE AND OBJECTIVE BOX
[place] CARDIOLOGY-[facility]                          [address] [place] [address]                       Phone: [phone]. Fax:[phone]                      ________________________________________________          HPI:  [id number] who presented to the hospital yesterday with dyspnea and chest discomfort. The patient reported that he had intermittent episodes of dyspnea with exertion over the past few months which worsened over the past few weeks. Most recently, the symptoms occurred with minimal exertion. He reported that the symptoms would be relieved with rest after 45 minutes. He had also endorsed that the symptoms had also improved with alprazolam. He reported a prior history of dyspnea and chest pain many years prior which resulted in cardiac stent placement. He was also evaluated at [facility] last month for an episode of syncope without any significant findings. The patient was placed under observation and evaluated by Cardiology. A nuclear stress test was obtained and noted wall motion abnormalities. The patient was thought to require further ischemic evaluation and admission to the hospital was requested. The patient also admits to occasional palpitations. He denied any recurrent episodes of syncope since discharge from [facility]. (31 Aug 2020 15:20)    ROS: All review of systems negative unless indicated otherwise below.                          LAB RESULTS                   COMPLETE BLOOD COUNT( 02 Sep 2020 06:03 )                            12.4 g/dL<L>  7.81 K/uL )---------------( 144 K/uL<L>                        37.9 %<L>      Automated Differential     Auto Basophil # - 0.03 K/uL  Auto Basophil % - 0.4 %  Auto Eosinophil # - 0.28 K/uL  Auto Eosinophil % - 3.6 %  Auto Immature Granulocyte # - X      Auto Immature Granulocyte % - 0.6 %  Auto Lymphocyte # - 1.60 K/uL  Auto Lymphocyte % - 20.5 %  Auto Monocyte # - 0.90 K/uL  Auto Monocyte % - 11.5 %  Auto Neutrophil # - 4.95 K/uL  Auto Neutrophil % - 63.4 %                                  CHEMISTRY                 Basic Metabolic Panel (20 @ 06:03)    138  |  104  |  29.0<H>  ----------------------------<  107<H>  3.9   |  22.0  |  1.29    Ca    9.2      02 Sep 2020 06:03  Mg     2.3     0902                    Liver Functions (20 @ 06:03))  TPro  6.0  /  Alb  3.6  /  TBili  0.4  /  DBili  x   /  AST  15  /  ALT  12  /  AlkPhos  60                               Cardiac Enzymes   ( 31 Aug 2020 05:07 )  Troponin T  <0.01,  CPK  X    , CKMB  X    , BNP X        , ( 30 Aug 2020 15:36 )  Troponin T  <0.01,  CPK  X    , CKMB  X    , BNP X        , ( 30 Aug 2020 09:28 )  Troponin T  <0.01,  CPK  X    , CKMB  X    , <H>                          RADIOLOGY RESULTS: Personally visualized   < from: Xray Chest 2 Views PA/Lat (20 @ 09:50) >  IMPRESSION: Negative chest.    < end of copied text >                            CARDIOLOGY RESULTS: Official Report/Preliminary Verbal Reports    ECHO:   < from: TTE Echo Complete w/ Contrast w/ Doppler (20 @ 13:51) >  Summary:   1. Left ventricular ejection fraction, by visual estimation, is 50 to 55%.   2. Technically fair study.   3. Spectral Doppler shows impaired relaxation pattern of left ventricular myocardial filling (Grade I diastolic dysfunction).   4. There is no evidence of pericardial effusion.   5. Mild to moderate mitral valve regurgitation.   6. Mild aortic regurgitation.   7. Peak transaortic gradient equals 19.7 mmHg, mean transaortic gradient equals 9.2 mmHg, the calculated aortic valve area equals 1.29 cm² by the continuity equation consistent with moderate aortic stenosis.    MD Margareth Electronically signed on 2020 at 2:40:18 PM    < end of copied text >                         CARDIOLOGY REVIEW: Personally visualized and reviewed  Telemetry Last 24h: SB/SR 50-60s                             DAILY WEIGHTS - 48 HOUR TREND   Daily Weight in k.7 (02 Sep 2020 04:17)                             INTAKE AND OUTPUT - 48 HOUR TREND     20 @ 07:01  -  20 @ 07:00  --------------------------------------------------------  IN:  Total IN: 0 mL    OUT:    Voided: 900 mL  Total OUT: 900 mL    Total NET: -900 mL    HOME MEDICATIONS:  allopurinol 100 mg oral tablet: 1 tab(s) orally once a day (01 Sep 2020 07:36)  carisoprodol 350 mg oral tablet: 1 tab(s) orally 2 times a day (01 Sep 2020 07:36)  Coreg 12.5 mg oral tablet: 1 tab(s) orally 2 times a day (01 Sep 2020 07:36)  escitalopram 10 mg oral tablet: 1 tab(s) orally once a day (01 Sep 2020 07:36)  Flomax 0.4 mg oral capsule: 1 cap(s) orally once a day (01 Sep 2020 07:36)  gabapentin 300 mg oral tablet: orally 3 times a day (01 Sep 2020 07:36)  Imdur 30 mg oral tablet, extended release: 1 tab(s) orally once a day (in the morning) (01 Sep 2020 07:36)  triazolam 0.25 mg oral tablet: 1 tab(s) orally once a day (at bedtime) (01 Sep 2020 07:36)                             Current Admission Active Medications  allopurinol 100 milliGRAM(s) Oral daily  ALPRAZolam 0.5 milliGRAM(s) Oral two times a day  aspirin enteric coated 81 milliGRAM(s) Oral daily  carvedilol 12.5 milliGRAM(s) Oral every 12 hours  enoxaparin Injectable 40 milliGRAM(s) SubCutaneous daily  escitalopram 10 milliGRAM(s) Oral daily  furosemide    Tablet 20 milliGRAM(s) Oral daily  gabapentin 300 milliGRAM(s) Oral three times a day  isosorbide   mononitrate ER Tablet (IMDUR) 30 milliGRAM(s) Oral daily  lisinopril 2.5 milliGRAM(s) Oral daily  tamsulosin 0.4 milliGRAM(s) Oral at bedtime  temazepam 15 milliGRAM(s) Oral at bedtime PRN Insomnia                        PHYSICAL EXAM:    Vital Signs Last 24 Hrs  T(C): 36.6 (02 Sep 2020 08:54), Max: 36.7 (01 Sep 2020 21:38)  T(F): 97.8 (02 Sep 2020 08:54), Max: 98 (01 Sep 2020 21:38)  HR: 70 (02 Sep 2020 13:07) (52 - 80)  BP: 118/74 (02 Sep 2020 13:07) (101/64 - 128/80)  BP(mean): --  RR: 17 (02 Sep 2020 08:54) (17 - 18)  SpO2: 97% (02 Sep 2020 08:54) (95% - 98%)    GENERAL: NAD  NECK: Supple, No JVD  NERVOUS SYSTEM:  Alert & Oriented X3, non focal neuro exam.   CHEST/LUNG: clear lungs, No rales, rhonchi, wheezing, or rubs  HEART: Regular rate and rhythm; s1 and s2 auscultated, No murmurs, rubs, or gallops  ABDOMEN: Soft, Nontender, Nondistended; Bowel sounds present and normoactive.   EXTREMITIES:  2+ Peripheral Pulses, No clubbing, cyanosis, or edema  CATH SITE: Right wrist benign s/p cath.  No bleeding, no ecchymosis, no hematoma. Extremity Warm to touch, with palpable distal pulses, and brisk capillary refill.

## 2020-09-02 NOTE — PROGRESS NOTE ADULT - SUBJECTIVE AND OBJECTIVE BOX
[facility]  ----------------------------------------  The patient was seen and evaluated for heart failure. Reports feeling better today.    Vital Signs Last 24 Hrs  T(C): 36.6 (02 Sep 2020 08:54), Max: 36.7 (01 Sep 2020 21:38)  T(F): 97.8 (02 Sep 2020 08:54), Max: 98 (01 Sep 2020 21:38)  HR: 54 (02 Sep 2020 08:54) (49 - 80)  BP: 101/64 (02 Sep 2020 08:54) (101/64 - 128/80)  BP(mean): --  RR: 17 (02 Sep 2020 08:54) (17 - 18)  SpO2: 97% (02 Sep 2020 08:54) (93% - 98%)    PHYSICAL EXAMINATION:  ----------------------------------------  General appearance: No acute distress, Awake, Alert  HEENT: Normocephalic, Atraumatic, Conjunctiva clear, EOMI  Neck: Supple, No JVD, No tenderness  Lungs: Breath sound equal bilaterally, No wheezes, No rales  Cardiovascular: S1S2, Regular rhythm  Abdomen: Soft, Nontender, Nondistended, No guarding/rebound, Positive bowel sounds  Extremities: No clubbing, No cyanosis, No edema, No calf tenderness  Neuro: Strength equal bilaterally, No tremors  Psychiatric: Appropriate mood, Normal affect    LABORATORY STUDIES:  ----------------------------------------             12.4   7.81  )-----------( 144      ( 02 Sep 2020 06:03 )             37.9     09-02    138  |  104  |  29.0<H>  ----------------------------<  107<H>  3.9   |  22.0  |  1.29    Ca    9.2      02 Sep 2020 06:03  Mg     2.3     09-02    TPro  6.0<L>  /  Alb  3.6  /  TBili  0.4  /  DBili  x   /  AST  15  /  ALT  12  /  AlkPhos  60  09-02    LIVER FUNCTIONS - ( 02 Sep 2020 06:03 )  Alb: 3.6 g/dL / Pro: 6.0 g/dL / ALK PHOS: 60 U/L / ALT: 12 U/L / AST: 15 U/L / GGT: x           MEDICATIONS  (STANDING):  allopurinol 100 milliGRAM(s) Oral daily  ALPRAZolam 0.5 milliGRAM(s) Oral two times a day  aspirin enteric coated 81 milliGRAM(s) Oral daily  carvedilol 12.5 milliGRAM(s) Oral every 12 hours  enoxaparin Injectable 40 milliGRAM(s) SubCutaneous daily  escitalopram 10 milliGRAM(s) Oral daily  furosemide    Tablet 20 milliGRAM(s) Oral daily  gabapentin 300 milliGRAM(s) Oral three times a day  isosorbide   mononitrate ER Tablet (IMDUR) 30 milliGRAM(s) Oral daily  lisinopril 2.5 milliGRAM(s) Oral daily  tamsulosin 0.4 milliGRAM(s) Oral at bedtime    MEDICATIONS  (PRN):  temazepam 15 milliGRAM(s) Oral at bedtime PRN Insomnia      ASSESSMENT / PLAN:  ----------------------------------------  80M with a history of coronary artery disease, hypertension, TIA, BPH, Gout, and anxiety who presented with shortness or breath and chest discomfort with exertion noted to have an abnormal stress test. Cardiac catheterization later noted patent stent.    Acute diastolic heart failure - On furosemide and lisinopril. No hypoxia noted on examination. Cardiology follow up noted, to monitor overnight.    Coronary artery disease - CT angiogram was without pulmonary embolism. Cardiac catheterization was without significant obstructive disease, patent stent.    Bradycardia -Sinus rhythm. Heart rate improved with exertion.    Hypertension - Close blood pressure monitoring. On carvedilol and isosorbide.    Gout - On allopurinol. No symptoms of flare on examination.    BPH - On tamsulosin. Monitoring for urinary symptoms.    Anxiety - On escitalopram with alprazolam as needed.

## 2020-09-02 NOTE — PROGRESS NOTE ADULT - ASSESSMENT
81 y/o M PMH MI 2002, 2009, PCI x 1, TIA 2010, HTN, BPH presents to ED with c/o difficulty breathing x 3 weeks. Breathing worse with exertion, improved with rest, alprazolam. Also endorses palpitations, lightheaded, dizziness. States hospitalized in July for syncope/difficulty breathing, seen at [facility].  Cardiac cath 2018- normal . Pt was sent for NST segmental wall motion abnormalities in RCA with a EF 44%  and echo which revealed EF 50 % with moderate mitral regurgitation and moderate AS.  CTA of the pulmonary arteries shows no evidence of pulmonary embolism   Cardiac cath done shows non-obstructive disease with mild ISR (in-stent restenosis) and LVEF 40-45% on LV gram     A/P  1) CAD s/p PCI; 2009  - Cardiac cath shows mild non-obstructive disease with mild ISR / Echo with normal EF, grade I DD, mild to mod MR, and mod AS  - troponin neg x1 and proBNP 647  - Continue with ASA 81mg po q daily and statin   - Lipid Panel in AM   - c/w coreg 12.5mg po q 12hrs     2) Acute HFrEF (EF 44%), NYHA class 2 / stage C  - euvolemic on physical examination with interval improvement in shortness of breath  - mild interval increase in Cr to 1.29  - c/w Lasix 20mg po q daily, Lisinopril 2.5mg po q daily and Coreg 12.5mg po q 12 hrs   - monitor I/O   - c/w tele monitor   - OOB to chair today   - will keep one more night and possible D/C on 9/3/2020    3) HTN  - normotensive   - cont home medication regimen   - if has episodes of hypotension consider d/c Imdur     4) Sinus Bradycardia w/ no evidence of chronotropic incompetence   - on Coreg 12.5mg po BID (will uptitrate based on BP); no evidence of chronotropic incompetence   - Patient with a few beats of AIVR, and having frequent PVCs.   - Ambulated patient with good chronotropic response.     5) DVT ppx: on Lovenox 40mg sq     6)S/p coronary angiogram  - Patient educated about wrist site care, signs and symptoms of complication post procedure, and plan of care moving forward. Patient verbalized understanding with teach-back.   - patient not a candidate for cardiac rehab secondary to: no intervention

## 2020-09-03 ENCOUNTER — TRANSCRIPTION ENCOUNTER (OUTPATIENT)
Age: 80
End: 2020-09-03

## 2020-09-03 VITALS
OXYGEN SATURATION: 93 % | HEART RATE: 64 BPM | RESPIRATION RATE: 18 BRPM | SYSTOLIC BLOOD PRESSURE: 96 MMHG | TEMPERATURE: 98 F | DIASTOLIC BLOOD PRESSURE: 56 MMHG

## 2020-09-03 LAB
ALBUMIN SERPL ELPH-MCNC: 3.7 G/DL — SIGNIFICANT CHANGE UP (ref 3.3–5.2)
ALP SERPL-CCNC: 58 U/L — SIGNIFICANT CHANGE UP (ref 40–120)
ALT FLD-CCNC: 11 U/L — SIGNIFICANT CHANGE UP
ANION GAP SERPL CALC-SCNC: 14 MMOL/L — SIGNIFICANT CHANGE UP (ref 5–17)
AST SERPL-CCNC: 19 U/L — SIGNIFICANT CHANGE UP
BILIRUB SERPL-MCNC: 0.4 MG/DL — SIGNIFICANT CHANGE UP (ref 0.4–2)
BUN SERPL-MCNC: 31 MG/DL — HIGH (ref 8–20)
CALCIUM SERPL-MCNC: 9 MG/DL — SIGNIFICANT CHANGE UP (ref 8.6–10.2)
CHLORIDE SERPL-SCNC: 103 MMOL/L — SIGNIFICANT CHANGE UP (ref 98–107)
CHOLEST SERPL-MCNC: 143 MG/DL — SIGNIFICANT CHANGE UP (ref 110–199)
CO2 SERPL-SCNC: 21 MMOL/L — LOW (ref 22–29)
CREAT SERPL-MCNC: 1.31 MG/DL — HIGH (ref 0.5–1.3)
GLUCOSE SERPL-MCNC: 108 MG/DL — HIGH (ref 70–99)
HDLC SERPL-MCNC: 33 MG/DL — LOW
LIPID PNL WITH DIRECT LDL SERPL: 82 MG/DL — SIGNIFICANT CHANGE UP
POTASSIUM SERPL-MCNC: 3.9 MMOL/L — SIGNIFICANT CHANGE UP (ref 3.5–5.3)
POTASSIUM SERPL-SCNC: 3.9 MMOL/L — SIGNIFICANT CHANGE UP (ref 3.5–5.3)
PROT SERPL-MCNC: 6.1 G/DL — LOW (ref 6.6–8.7)
SODIUM SERPL-SCNC: 138 MMOL/L — SIGNIFICANT CHANGE UP (ref 135–145)
TOTAL CHOLESTEROL/HDL RATIO MEASUREMENT: 4 RATIO — SIGNIFICANT CHANGE UP (ref 3.4–9.6)
TRIGL SERPL-MCNC: 138 MG/DL — SIGNIFICANT CHANGE UP (ref 10–200)

## 2020-09-03 PROCEDURE — 99153 MOD SED SAME PHYS/QHP EA: CPT

## 2020-09-03 PROCEDURE — 83735 ASSAY OF MAGNESIUM: CPT

## 2020-09-03 PROCEDURE — 99232 SBSQ HOSP IP/OBS MODERATE 35: CPT

## 2020-09-03 PROCEDURE — 99285 EMERGENCY DEPT VISIT HI MDM: CPT | Mod: 25

## 2020-09-03 PROCEDURE — 84443 ASSAY THYROID STIM HORMONE: CPT

## 2020-09-03 PROCEDURE — 71046 X-RAY EXAM CHEST 2 VIEWS: CPT

## 2020-09-03 PROCEDURE — G0378: CPT

## 2020-09-03 PROCEDURE — 80061 LIPID PANEL: CPT

## 2020-09-03 PROCEDURE — 93458 L HRT ARTERY/VENTRICLE ANGIO: CPT

## 2020-09-03 PROCEDURE — 93017 CV STRESS TEST TRACING ONLY: CPT

## 2020-09-03 PROCEDURE — 84484 ASSAY OF TROPONIN QUANT: CPT

## 2020-09-03 PROCEDURE — 71275 CT ANGIOGRAPHY CHEST: CPT

## 2020-09-03 PROCEDURE — 36415 COLL VENOUS BLD VENIPUNCTURE: CPT

## 2020-09-03 PROCEDURE — 87635 SARS-COV-2 COVID-19 AMP PRB: CPT

## 2020-09-03 PROCEDURE — 80048 BASIC METABOLIC PNL TOTAL CA: CPT

## 2020-09-03 PROCEDURE — 99239 HOSP IP/OBS DSCHRG MGMT >30: CPT

## 2020-09-03 PROCEDURE — 81001 URINALYSIS AUTO W/SCOPE: CPT

## 2020-09-03 PROCEDURE — 99152 MOD SED SAME PHYS/QHP 5/>YRS: CPT

## 2020-09-03 PROCEDURE — 93005 ELECTROCARDIOGRAM TRACING: CPT

## 2020-09-03 PROCEDURE — 85027 COMPLETE CBC AUTOMATED: CPT

## 2020-09-03 PROCEDURE — 83880 ASSAY OF NATRIURETIC PEPTIDE: CPT

## 2020-09-03 PROCEDURE — C8929: CPT

## 2020-09-03 PROCEDURE — 80053 COMPREHEN METABOLIC PANEL: CPT

## 2020-09-03 PROCEDURE — C1769: CPT

## 2020-09-03 PROCEDURE — A9500: CPT

## 2020-09-03 PROCEDURE — C1887: CPT

## 2020-09-03 PROCEDURE — 78452 HT MUSCLE IMAGE SPECT MULT: CPT

## 2020-09-03 RX ORDER — ASPIRIN/CALCIUM CARB/MAGNESIUM 324 MG
1 TABLET ORAL
Qty: 0 | Refills: 0 | DISCHARGE
Start: 2020-09-03

## 2020-09-03 RX ORDER — FUROSEMIDE 40 MG
1 TABLET ORAL
Qty: 15 | Refills: 0
Start: 2020-09-03 | End: 2020-10-02

## 2020-09-03 RX ORDER — ALPRAZOLAM 0.25 MG
1 TABLET ORAL
Qty: 0 | Refills: 0 | DISCHARGE
Start: 2020-09-03

## 2020-09-03 RX ADMIN — GABAPENTIN 300 MILLIGRAM(S): 400 CAPSULE ORAL at 11:26

## 2020-09-03 RX ADMIN — Medication 81 MILLIGRAM(S): at 11:26

## 2020-09-03 RX ADMIN — Medication 100 MILLIGRAM(S): at 11:26

## 2020-09-03 RX ADMIN — Medication 20 MILLIGRAM(S): at 05:20

## 2020-09-03 RX ADMIN — Medication 0.5 MILLIGRAM(S): at 11:26

## 2020-09-03 RX ADMIN — GABAPENTIN 300 MILLIGRAM(S): 400 CAPSULE ORAL at 05:20

## 2020-09-03 RX ADMIN — ISOSORBIDE MONONITRATE 30 MILLIGRAM(S): 60 TABLET, EXTENDED RELEASE ORAL at 11:27

## 2020-09-03 RX ADMIN — ESCITALOPRAM OXALATE 10 MILLIGRAM(S): 10 TABLET, FILM COATED ORAL at 11:26

## 2020-09-03 NOTE — PROGRESS NOTE ADULT - ASSESSMENT
79 y/o M PMH MI 2002, 2009, PCI x 1, TIA 2010, HTN, BPH presents to ED with c/o difficulty breathing x 3 weeks. Breathing worse with exertion, improved with rest, alprazolam. Also endorses palpitations, lightheaded, dizziness. States hospitalized in July for syncope/difficulty breathing, seen at [facility] [place].  Cardiac cath 2018- normal . Pt was sent for NST segmental wall motion abnormalities in RCA with a EF 44%  and echo which revealed EF 50 % with moderate mitral regurgitation and moderate AS.  CTA of the pulmonary arteries shows no evidence of pulmonary embolism   Cardiac cath done shows non-obstructive disease with mild ISR (in-stent restenosis) and LVEF 40-45% on LV gram     A/P  1) CAD s/p PCI; 2009  - Cardiac cath shows mild non-obstructive disease with mild ISR / Echo with normal EF, grade I DD, mild to mod MR, and mod AS  - troponin neg x1 and proBNP 647  - Continue with ASA 81mg po q daily and statin   - Lipid Panel in AM   - c/w coreg 12.5mg po q 12hrs     2) Acute HFrEF (EF 44%), NYHA class 2 / stage C  - euvolemic on physical examination with interval improvement in shortness of breath  - mild interval increase in Cr to 1.29, obtain CMP today   - c/w Lisinopril 2.5mg po q daily and Coreg 12.5mg po q 12 hrs and recommend Lasix 20mg po every other day upon discharge home   - monitor I/O   - c/w tele monitor   - OOB to chair today     3) HTN  - normotensive   - cont home medication regimen   - d/c Imdur     4) Sinus Bradycardia w/ no evidence of chronotropic incompetence   - on Coreg 12.5mg po BID (will uptitrate based on BP); no evidence of chronotropic incompetence   - Patient with a few beats of AIVR, and having frequent PVCs.   - Ambulated patient with good chronotropic response.     5) DVT ppx: on Lovenox 40mg sq     Patient will follow up in 1-2 weeks in the office if being discharged today; discussed with patient will need to obtain records from [place]     Thank You   Bebe Myers D.O.   Cardiology Attending   #[phone] .

## 2020-09-03 NOTE — DISCHARGE NOTE PROVIDER - CARE PROVIDER_API CALL
Aleksandr Matthew  FAMILY MEDICINE  [facility]  Dept of Family Medicine, [address] [place] [address]  Phone: [phone]  Fax: [phone]  Follow Up Time:     Bebe Myers (DO)  Internal Medicine  [address] [place] [address]  Phone: [phone]  Fax: [phone]  Follow Up Time:

## 2020-09-03 NOTE — PROGRESS NOTE ADULT - SUBJECTIVE AND OBJECTIVE BOX
[facility]  ----------------------------------------  The patient was seen and evaluated for heart failure. Offers no complaints. Ambulated without difficulty.    Vital Signs Last 24 Hrs  T(C): 36.8 (03 Sep 2020 10:10), Max: 36.8 (03 Sep 2020 10:10)  T(F): 98.2 (03 Sep 2020 10:10), Max: 98.2 (03 Sep 2020 10:10)  HR: 54 (03 Sep 2020 10:10) (54 - 71)  BP: 119/69 (03 Sep 2020 10:10) (92/51 - 119/69)  BP(mean): --  RR: 18 (03 Sep 2020 10:10) (17 - 18)  SpO2: 94% (03 Sep 2020 10:10) (92% - 97%)    PHYSICAL EXAMINATION:  ----------------------------------------  General appearance: No acute distress, Awake, Alert  HEENT: Normocephalic, Atraumatic, Conjunctiva clear, EOMI  Neck: Supple, No JVD, No tenderness  Lungs: Breath sound equal bilaterally, No wheezes, No rales  Cardiovascular: S1S2, Regular rhythm  Abdomen: Soft, Nontender, Nondistended, No guarding/rebound, Positive bowel sounds  Extremities: No clubbing, No cyanosis, No edema, No calf tenderness  Neuro: Strength equal bilaterally, No tremors  Psychiatric: Appropriate mood, Normal affect    LABORATORY STUDIES:  ----------------------------------------             12.4   7.81  )-----------( 144      ( 02 Sep 2020 06:03 )             37.9     09-02    138  |  104  |  29.0<H>  ----------------------------<  107<H>  3.9   |  22.0  |  1.29    Ca    9.2      02 Sep 2020 06:03  Mg     2.3     09-02    TPro  6.0<L>  /  Alb  3.6  /  TBili  0.4  /  DBili  x   /  AST  15  /  ALT  12  /  AlkPhos  60  09-02    LIVER FUNCTIONS - ( 02 Sep 2020 06:03 )  Alb: 3.6 g/dL / Pro: 6.0 g/dL / ALK PHOS: 60 U/L / ALT: 12 U/L / AST: 15 U/L / GGT: x           MEDICATIONS  (STANDING):  allopurinol 100 milliGRAM(s) Oral daily  ALPRAZolam 0.5 milliGRAM(s) Oral two times a day  aspirin enteric coated 81 milliGRAM(s) Oral daily  carvedilol 12.5 milliGRAM(s) Oral every 12 hours  enoxaparin Injectable 40 milliGRAM(s) SubCutaneous daily  escitalopram 10 milliGRAM(s) Oral daily  furosemide    Tablet 20 milliGRAM(s) Oral daily  gabapentin 300 milliGRAM(s) Oral three times a day  isosorbide   mononitrate ER Tablet (IMDUR) 30 milliGRAM(s) Oral daily  tamsulosin 0.4 milliGRAM(s) Oral at bedtime    MEDICATIONS  (PRN):  temazepam 15 milliGRAM(s) Oral at bedtime PRN Insomnia      ASSESSMENT / PLAN:  ----------------------------------------  80M with a history of coronary artery disease, hypertension, TIA, BPH, Gout, and anxiety who presented with shortness or breath and chest discomfort with exertion noted to have an abnormal stress test. Cardiac catheterization later noted patent stent and furosemide was initiated for heart failure.    Acute diastolic heart failure - On furosemide and lisinopril. No hypoxia noted on examination. Cardiology follow up noted, for further management on an outpatient basis.    Coronary artery disease - CT angiogram was without pulmonary embolism. Cardiac catheterization was without significant obstructive disease, patent stent.    Bradycardia -Sinus rhythm. Heart rate improved with exertion.    Hypertension - Close blood pressure monitoring. On carvedilol and isosorbide.    Gout - On allopurinol. No symptoms of flare on examination.    BPH - On tamsulosin. Monitoring for urinary symptoms.    Anxiety - On escitalopram with alprazolam as needed.

## 2020-09-03 NOTE — DISCHARGE NOTE PROVIDER - NSDCMRMEDTOKEN_GEN_ALL_CORE_FT
allopurinol 100 mg oral tablet: 1 tab(s) orally once a day  carisoprodol 350 mg oral tablet: 1 tab(s) orally 2 times a day  Coreg 12.5 mg oral tablet: 1 tab(s) orally 2 times a day  escitalopram 10 mg oral tablet: 1 tab(s) orally once a day  Flomax 0.4 mg oral capsule: 1 cap(s) orally once a day  gabapentin 300 mg oral tablet: orally 3 times a day  Imdur 30 mg oral tablet, extended release: 1 tab(s) orally once a day (in the morning)  triazolam 0.25 mg oral tablet: 1 tab(s) orally once a day (at bedtime) allopurinol 100 mg oral tablet: 1 tab(s) orally once a day  ALPRAZolam 0.5 mg oral tablet: 1 tab(s) orally 2 times a day, As Needed  aspirin 81 mg oral delayed release tablet: 1 tab(s) orally once a day  carisoprodol 350 mg oral tablet: 1 tab(s) orally 2 times a day  Coreg 12.5 mg oral tablet: 1 tab(s) orally 2 times a day  escitalopram 10 mg oral tablet: 1 tab(s) orally once a day  Flomax 0.4 mg oral capsule: 1 cap(s) orally once a day  furosemide 20 mg oral tablet: 1 tab(s) orally every other day   gabapentin 300 mg oral tablet: orally 3 times a day  Imdur 30 mg oral tablet, extended release: 1 tab(s) orally once a day (in the morning)  triazolam 0.25 mg oral tablet: 1 tab(s) orally once a day (at bedtime)

## 2020-09-03 NOTE — DISCHARGE NOTE NURSING/CASE MANAGEMENT/SOCIAL WORK - PATIENT PORTAL LINK FT
You can access the FollowMyHealth Patient Portal offered by [facility] by registering at the following website: [email or web address]. By joining [facility]’s FollowMyHealth portal, you will also be able to view your health information using other applications (apps) compatible with our system.

## 2020-09-03 NOTE — PROGRESS NOTE ADULT - ASSESSMENT
NP assessment  80M who presented to the hospital yesterday with dyspnea and chest discomfort. The patient reported that he had intermittent episodes of dyspnea with exertion over the past few months which worsened over the past few weeks. Most recently, the symptoms occurred with minimal exertion. He reported that the symptoms would be relieved with rest after 45 minutes. He had also endorsed that the symptoms had also improved with alprazolam. He reported a prior history of dyspnea and chest pain many years prior which resulted in cardiac stent placement. He was also evaluated at [facility] last month for an episode of syncope without any significant findings. The patient was placed under observation and evaluated by Cardiology. A nuclear stress test was obtained and noted wall motion abnormalities. The patient was thought to require further ischemic evaluation and admission to the hospital was requested. The patient also admits to occasional palpitations. He denied any recurrent episodes of syncope since discharge from [facility].   Cleared by Cardiology for discharge today, recommended Lasix 20 every other day and cmp.  MD Beal will follow up 1 week outpatient.  Creatinine 1.31 today.  MD Schultz aware and Lasix decreased for discharge.

## 2020-09-03 NOTE — PROGRESS NOTE ADULT - PROVIDER SPECIALTY LIST ADULT
Cardiology Discharge assessment completed by SHAYY Beaulieu....CANDY Nguyen       10/27/18 1519   Discharge Assessment   Assessment Type Discharge Planning Assessment

## 2020-09-03 NOTE — DISCHARGE NOTE PROVIDER - NSDCCPCAREPLAN_GEN_ALL_CORE_FT
PRINCIPAL DISCHARGE DIAGNOSIS  Diagnosis: Heart failure  Assessment and Plan of Treatment: Continue on furosemide. Follow up with Cardiology for further management.      SECONDARY DISCHARGE DIAGNOSES  Diagnosis: BPH without urinary obstruction  Assessment and Plan of Treatment: Continue on tamsulosin.    Diagnosis: Gout  Assessment and Plan of Treatment: Continue on allopurinol.

## 2020-09-03 NOTE — PROGRESS NOTE ADULT - SUBJECTIVE AND OBJECTIVE BOX
[facility]                                                               Office: [address] [address]                                                              Telephone: [phone]. Fax:[phone]                                                                             PROGRESS NOTE  Reason for follow up: Shortness of breath   Overnight: No new events.   Update: Patient states that he ambulated 8 laps on the unit with interval improvement in shortness of breath.   Discussed diagnosis and will follow up in the office and will need to get records from [place] regarding last EF     Subjective: "  _feeling well_________"      	  Vitals:  T(C): 36.8 (09-03-20 @ 10:10), Max: 36.8 (09-03-20 @ 10:10)  HR: 54 (09-03-20 @ 10:10) (54 - 71)  BP: 119/69 (09-03-20 @ 10:10) (92/51 - 119/69)  RR: 18 (09-03-20 @ 10:10) (17 - 18)  SpO2: 94% (09-03-20 @ 10:10) (92% - 97%)  Wt(kg): --  I&O's Summary    02 Sep 2020 07:01  -  03 Sep 2020 07:00  --------------------------------------------------------  IN: 480 mL / OUT: 1150 mL / NET: -670 mL      Weight (kg): 97.5 (09-01 @ 06:25)      PHYSICAL EXAM:  Appearance: Comfortable. No acute distress  HEENT:  Head and neck: Atraumatic. Normocephalic.  Normal oral mucosa, PERRL, Neck is supple. No JVD, No carotid bruit.   Neurologic: A & O x 3, no focal deficits. EOMI.  Lymphatic: No cervical lymphadenopathy  Cardiovascular: Normal S1 S2, No murmur, rubs/gallops. No JVD, No edema  Respiratory: Lungs clear to auscultation  Gastrointestinal:  Soft, Non-tender, + BS  Lower Extremities: No edema  Psychiatry: Patient is calm. No agitation. Mood & affect appropriate  Skin: No rashes/ ecchymoses/cyanosis/ulcers visualized on the face, hands or feet.      CURRENT MEDICATIONS:  carvedilol 12.5 milliGRAM(s) Oral every 12 hours  furosemide    Tablet 20 milliGRAM(s) Oral daily  isosorbide   mononitrate ER Tablet (IMDUR) 30 milliGRAM(s) Oral daily  tamsulosin 0.4 milliGRAM(s) Oral at bedtime    ALPRAZolam  escitalopram  gabapentin  allopurinol  aspirin enteric coated  enoxaparin Injectable      DIAGNOSTIC TESTING:  [ ] Echocardiogram:   [ ]  Catheterization:  [ ] Stress Test:    OTHER: 	      LABS:	 	                            12.4   7.81  )-----------( 144      ( 02 Sep 2020 06:03 )             37.9     09-02    138  |  104  |  29.0<H>  ----------------------------<  107<H>  3.9   |  22.0  |  1.29    Ca    9.2      02 Sep 2020 06:03  Mg     2.3     09-02    TPro  6.0<L>  /  Alb  3.6  /  TBili  0.4  /  DBili  x   /  AST  15  /  ALT  12  /  AlkPhos  60  09-02    proBNP: Serum Pro-Brain Natriuretic Peptide: 647 pg/mL (08-30 @ 09:28)    Lipid Profile: Date: 09-03 @ 05:49  Total cholesterol 143; Direct LDL: 82; HDL: 33; Triglycerides:138    HgA1c:   TSH: Thyroid Stimulating Hormone, Serum: 3.13 uIU/mL        TELEMETRY: Reviewed and noted to be bradycardic 55-60 bpm on the telemetry monitor   ECG:  Reviewed by me.

## 2020-09-03 NOTE — DISCHARGE NOTE NURSING/CASE MANAGEMENT/SOCIAL WORK - NSSCTYPOFSERV_GEN_ALL_CORE
Pt came to clinic for medication. Pt has received 6 tablets no charge. MA has termed. I have sent an in basket to Dr. Amor and Girish Echeverria.     Last Filled on: 11/04/19    Yoana Dwyer CPhT  [facility]  [phone]        NURSING

## 2020-09-03 NOTE — DISCHARGE NOTE NURSING/CASE MANAGEMENT/SOCIAL WORK - NSDPDISTO_GEN_ALL_CORE
LOCATIONS THAT PFT CAN BE PERFORMED       [place] OFFICE [address] [facility] [address]  [phone]    [facility] OFFICE [phone]   [facility] OFF[facility]  [phone]        Pulmonary Function Tests  Pulmonary function tests (also called lung function tests) help measure how well your lungs are working. These tests are done to diagnose lung conditions such as Asthma and COPD. They may be done before and after you take certain medications. Over time, pulmonary function tests can help you and your healthcare providers see how well your treatment is working. Your Experience  A complete pulmonary function test has 3Â parts. You may be given the entire test or only certain parts. The entire test is painless and can last 45-90 minutes. If you get tired, you can take a break between test sections. No inhalers 4 - 6 hours before the test  No smoking 4 - 6 hours before the test  No caffeine 4 - 6 hours before the test  No vigorous exercise immediatly before the test.  No lip stick or Chap stick  Wear loose clothing. No perfume or cologne  Eat a light meal. You do not have to fast.         Â· No Prednisone 14 days prior to test, unless prednisone is used as a chronic stable dose for management of other disease processes. During Your Test  The test is painless. The healthcare provider will  you during your test. You will sit down and will breathe into a machine while wearing nose clips so you only breathe through your mouth. You will be asked to breathe in several different ways. The machine will record your breathing patterns. After Your Test  After the test, you can return to your normal diet, activity, and medications. If you were asked to skip medications before the test, ask if you should take them now. A report will be sent to your doctor, and they will share the results and plan of care with you. LUCERO Marquis, 2016 [facility], [facility] [place] [employer].  All rights reserved. This information is not intended as a substitute for professional medical care. Always follow your healthcare professional's instructions. Home

## 2020-09-03 NOTE — PROGRESS NOTE ADULT - SUBJECTIVE AND OBJECTIVE BOX
NP assessment  80M who presented to the hospital yesterday with dyspnea and chest discomfort. The patient reported that he had intermittent episodes of dyspnea with exertion over the past few months which worsened over the past few weeks. Most recently, the symptoms occurred with minimal exertion. He reported that the symptoms would be relieved with rest after 45 minutes. He had also endorsed that the symptoms had also improved with alprazolam. He reported a prior history of dyspnea and chest pain many years prior which resulted in cardiac stent placement. He was also [place] [facility] [place] for an episode of syncope without any significant findings. The patient was placed under observation and evaluated by Cardiology. A nuclear stress test was obtained and noted wall motion abnormalities. The patient was thought to require further ischemic evaluation and admission to the hospital was requested. The patient also admits to occasional palpitations. He denied any recurrent episodes of syncope since discharge from [facility].   Cleared by Cardiology for discharge today, recommended Lasix 20 every other day and cmp.  MD Beal will follow up 1 week outpatient.  Creatinine 1.31 today.  MD Schultz aware and Lasix decreased for discharge.        Dyspnea  Family history of cerebrovascular accident (CVA)  Family history of early CAD  BPH (benign prostatic hyperplasia)  Gout  TIA (transient ischemic attack)  NSTEMI (non-ST elevated myocardial infarction)  ST elevation myocardial infarction (STEMI), unspecified artery  Hypertension, unspecified type  Heart failure  Dyspnea on exertion  H/O cervical spine surgery  H/O hernia repair  S/P appendectomy  S/P cholecystectomy  H/O cardiac catheterization  Gout    REVIEW OF SYSTEMS  General: Denies fever, chills, pain, no discomfort  Respiratory and Thorax:  Denies cough, sob, or any discomfort  Cardiovascular:  Denies chest pain, palpiations, or any discomfort	  Gastrointestinal:  Denies n/v/d, constipation, or any discomfort  Genitourinary:  Denies frequency, burning, or pain  Musculoskeletal:  Denies joint pain, swelling, or any discomfort   Neurological:  Denies headache, dizzyness, blurred vision, numbing or tingling  Psychiatric:  Denies sadness or depression  Hematology  Mohan bleeding o swelling    Allergic/Immunologic:	  MEDICATIONS:  carvedilol 12.5 milliGRAM(s) Oral every 12 hours  furosemide    Tablet 20 milliGRAM(s) Oral daily  isosorbide   mononitrate ER Tablet (IMDUR) 30 milliGRAM(s) Oral daily  tamsulosin 0.4 milliGRAM(s) Oral at bedtime  ALPRAZolam 0.5 milliGRAM(s) Oral two times a day  escitalopram 10 milliGRAM(s) Oral daily  gabapentin 300 milliGRAM(s) Oral three times a day  temazepam 15 milliGRAM(s) Oral at bedtime PRN  allopurinol 100 milliGRAM(s) Oral daily  aspirin enteric coated 81 milliGRAM(s) Oral daily  enoxaparin Injectable 40 milliGRAM(s) SubCutaneous daily    PHYSICAL EXAM:  T(C): 36.8 (20 @ 10:10), Max: 36.8 (20 @ 10:10)  HR: 54 (20 @ 10:10) (49 - 71)  BP: 119/69 (20 @ 10:10) (92/51 - 119/69)  RR: 18 (20 @ 10:10) (17 - 18)  SpO2: 94% (20 @ 10:10) (92% - 97%)    I&O's Summary  02 Sep 2020 07:01  -  03 Sep 2020 07:00  --------------------------------------------------------  IN: 480 mL / OUT: 1150 mL / NET: -670 mL  Daily Weight in k.2 (03 Sep 2020 06:55)    Appearance: Normal	  HEENT:   Normal oral mucosa, PERRL, EOMI	  Lymphatic: No lymphadenopathy  Cardiovascular: Normal S1 S2, No JVD, No murmurs, No edema  Respiratory: Lungs clear to auscultation	  Psychiatry: A & O x 3, Mood & affect appropriate  Gastrointestinal:  Soft, Non-tender, + BS	  Skin: No rashes, No ecchymoses, No cyanosis  Neurologic: Non-focal  Extremities: Normal range of motion, No clubbing, cyanosis or edema  Vascular: Peripheral pulses palpable 2+ bilaterally    TELEMETRY: 	      LABS:	 	                       12.4   7.81  )-----------( 144      ( 02 Sep 2020 06:03 )             37.9   138  |  103  |  31.0<H>  ----------------------------<  108<H>  3.9   |  21.0<L>  |  1.31<H>  Ca    9.0      03 Sep 2020 05:49  Mg     2.3       TPro  6.1<L>  /  Alb  3.7  /  TBili  0.4  /  DBili  x   /  AST  19  /  ALT  11  /  AlkPhos  58

## 2020-09-03 NOTE — DISCHARGE NOTE PROVIDER - HOSPITAL COURSE
80M presented with dyspnea on exertion and chest discomfort. On presentation, BUN/Cr(33/1.29), Trop(0.01), BNP(508). The patient was seen by Cardiology in consultation and placed under observation. Echocardiogram noted mildly increased left ventricular internal cavity size, ejection fraction of 50 to 55%, mild to moderate mitral valve regurgitation, moderate aortic stenosis. Serial troponin levels were not elevated. A nuclear stress test was noted to be abnormal, no ischemic ECG changes, scar in RCA territory, segmental wall motion abnormalities in RCA territory. The patient was seen by Cardiology and thought to benefit from further ischemic evaluation and was admitted to the hospital. CT angiogram of the lung noted bronchial plugging in the lower lobes bilaterally, patchy as well as ill-defined groundglass opacities involving both lungs, scattered calcified granulomata, no pulmonary embolism. The patient underwent cardiac catheterization which noted patent stent and was without significant obstructive coronary disease. The patient was continued on furosemide for acute on chronic diastolic heart failure. The patient had improvement in the symptoms. He was discharged home with instructions to follow up with his primary care physician and Cardiology for further management.                35 minutes total time

## 2020-09-10 PROBLEM — I21.4 NON-ST ELEVATION (NSTEMI) MYOCARDIAL INFARCTION: Chronic | Status: ACTIVE | Noted: 2020-08-30

## 2020-09-10 PROBLEM — I10 ESSENTIAL (PRIMARY) HYPERTENSION: Chronic | Status: ACTIVE | Noted: 2020-08-30

## 2020-09-10 PROBLEM — I21.3 ST ELEVATION (STEMI) MYOCARDIAL INFARCTION OF UNSPECIFIED SITE: Chronic | Status: ACTIVE | Noted: 2020-08-30

## 2020-09-10 PROBLEM — G45.9 TRANSIENT CEREBRAL ISCHEMIC ATTACK, UNSPECIFIED: Chronic | Status: ACTIVE | Noted: 2020-08-30

## 2020-09-10 PROBLEM — N40.0 BENIGN PROSTATIC HYPERPLASIA WITHOUT LOWER URINARY TRACT SYMPTOMS: Chronic | Status: ACTIVE | Noted: 2020-08-30

## 2020-09-10 PROBLEM — M10.9 GOUT, UNSPECIFIED: Chronic | Status: ACTIVE | Noted: 2020-08-30

## 2020-09-16 ENCOUNTER — NON-APPOINTMENT (OUTPATIENT)
Age: 80
End: 2020-09-16

## 2020-09-16 ENCOUNTER — APPOINTMENT (OUTPATIENT)
Dept: CARDIOLOGY | Facility: CLINIC | Age: 80
End: 2020-09-16
Payer: MEDICARE

## 2020-09-16 VITALS
TEMPERATURE: 98.7 F | HEART RATE: 53 BPM | DIASTOLIC BLOOD PRESSURE: 61 MMHG | WEIGHT: 205 LBS | SYSTOLIC BLOOD PRESSURE: 103 MMHG | BODY MASS INDEX: 27.8 KG/M2 | OXYGEN SATURATION: 97 %

## 2020-09-16 DIAGNOSIS — Z78.9 OTHER SPECIFIED HEALTH STATUS: ICD-10-CM

## 2020-09-16 DIAGNOSIS — G47.00 INSOMNIA, UNSPECIFIED: ICD-10-CM

## 2020-09-16 DIAGNOSIS — Z86.59 PERSONAL HISTORY OF OTHER MENTAL AND BEHAVIORAL DISORDERS: ICD-10-CM

## 2020-09-16 DIAGNOSIS — M10.9 GOUT, UNSPECIFIED: ICD-10-CM

## 2020-09-16 DIAGNOSIS — Z87.438 PERSONAL HISTORY OF OTHER DISEASES OF MALE GENITAL ORGANS: ICD-10-CM

## 2020-09-16 DIAGNOSIS — Z86.69 PERSONAL HISTORY OF OTHER DISEASES OF THE NERVOUS SYSTEM AND SENSE ORGANS: ICD-10-CM

## 2020-09-16 PROCEDURE — 93000 ELECTROCARDIOGRAM COMPLETE: CPT

## 2020-09-16 PROCEDURE — 99203 OFFICE O/P NEW LOW 30 MIN: CPT | Mod: 25

## 2020-09-16 RX ORDER — ALPRAZOLAM 0.5 MG/1
0.5 TABLET ORAL DAILY
Refills: 0 | Status: ACTIVE | COMMUNITY

## 2020-09-16 RX ORDER — CARVEDILOL 12.5 MG/1
12.5 TABLET, FILM COATED ORAL
Refills: 0 | Status: DISCONTINUED | COMMUNITY
End: 2020-09-16

## 2020-09-16 RX ORDER — TRIAZOLAM 0.25 MG/1
0.25 TABLET ORAL
Refills: 0 | Status: ACTIVE | COMMUNITY

## 2020-09-16 RX ORDER — CARISOPRODOL 350 MG/1
350 TABLET ORAL
Refills: 0 | Status: DISCONTINUED | COMMUNITY
End: 2020-09-16

## 2020-09-16 RX ORDER — ISOSORBIDE DINITRATE 5 MG/1
5 TABLET ORAL
Refills: 0 | Status: DISCONTINUED | COMMUNITY
End: 2020-09-16

## 2020-09-16 RX ORDER — ISOSORBIDE MONONITRATE 30 MG/1
30 TABLET, EXTENDED RELEASE ORAL DAILY
Refills: 3 | Status: ACTIVE | COMMUNITY
Start: 2020-09-16

## 2020-09-16 RX ORDER — CARISOPRODOL 350 MG/1
350 TABLET ORAL TWICE DAILY
Refills: 0 | Status: ACTIVE | COMMUNITY
Start: 2020-09-16

## 2020-09-16 RX ORDER — CLOBETASOL PROPIONATE 0.5 MG/ML
0.05 SOLUTION TOPICAL
Qty: 1 | Refills: 1 | Status: ACTIVE | COMMUNITY
Start: 2020-07-01

## 2020-09-16 RX ORDER — ESCITALOPRAM OXALATE 10 MG/1
10 TABLET ORAL
Refills: 0 | Status: DISCONTINUED | COMMUNITY
End: 2020-09-16

## 2020-09-16 RX ORDER — TRIAMCINOLONE ACETONIDE 1 MG/G
0.1 CREAM TOPICAL
Qty: 1 | Refills: 1 | Status: ACTIVE | COMMUNITY
Start: 2020-07-01

## 2020-09-16 NOTE — REASON FOR VISIT
[Follow-Up - From Hospitalization] : follow-up of a recent hospitalization for [FreeTextEntry1] : Shortness of breath

## 2020-09-16 NOTE — ASSESSMENT
[FreeTextEntry1] : 79 y/o M PMH MI (2002, 2009, PCI x 1), TIA 2010, HTN, BPH presents to ED with c/o difficulty breathing x 3 weeks; post discharge with shortness of breath \par \par 1. Dyspnea on rest or upon exertion \par - no evidence of obstructive CAD based on recent cath \par - on Lasix 20mg po q every other day \par - EKG shows bigeminy \par \par 2. CAD s/p MI in 2002 /2009 with PCI to the mid LAD \par - recent cath in 9/1/2020 shows minimal disease\par - on Coreg 12.5mg po q 12hrs \par \par 3. Hypertension \par - patient complains of occasional low blood pressure \par - on Coreg 12.5mg po q 12hrs \par \par Plan: \par - recommend to obtain CBC/CMP and Mag \par - start Mag ox \par - obtain 48 hr holter monitor and EP eval \par - continue with Lasix 20mg po q 48hrs \par - due to symptomatic hypotension will decrease Coreg 12.5mg po q 12hrs to 6.25mg po q 12hrs \par - will follow up in 1 month

## 2020-09-16 NOTE — HISTORY OF PRESENT ILLNESS
[FreeTextEntry1] : 81 y/o M PMH MI (2002, 2009, PCI x 1), TIA 2010, HTN, BPH presents to ED with c/o difficulty breathing x 3 weeks. Breathing worse with exertion, improved with rest, alprazolam. Also endorses palpitations, lightheaded, dizziness. States hospitalized in July for syncope/difficulty breathing, seen at [facility]. Pt was sent for NST segmental wall motion abnormalities in RCA with a EF 44%  and echo which revealed EF 50 % with moderate mitral regurgitation and moderate AS.\par CTA of the pulmonary arteries shows no evidence of pulmonary embolism \par Cardiac cath done shows non-obstructive disease with mild ISR 40% disease (in-stent restenosis) and LVEF 40-45% on LV gram \par \par Presents today for follow up stating that he's now short of breath with episodes of dizziness; stated that when he was in the hospital felt good with no complaints of shortness of breath with exertion and then a couple days after being discharged felt short of breath at rest or upon exertion. Currently taking Lasix 20mg po q every other day and noted that he has had periods of -110 mmHg; with associated palpitations. \par  \par \par

## 2020-09-18 LAB
ALBUMIN SERPL ELPH-MCNC: 4.6 G/DL
ALP BLD-CCNC: 65 U/L
ALT SERPL-CCNC: 11 U/L
ANION GAP SERPL CALC-SCNC: 14 MMOL/L
AST SERPL-CCNC: 14 U/L
BASOPHILS # BLD AUTO: 0.05 K/UL
BASOPHILS NFR BLD AUTO: 0.6 %
BILIRUB SERPL-MCNC: 0.9 MG/DL
BUN SERPL-MCNC: 52 MG/DL
CALCIUM SERPL-MCNC: 9.7 MG/DL
CHLORIDE SERPL-SCNC: 104 MMOL/L
CO2 SERPL-SCNC: 22 MMOL/L
CREAT SERPL-MCNC: 1.38 MG/DL
EOSINOPHIL # BLD AUTO: 0.26 K/UL
EOSINOPHIL NFR BLD AUTO: 2.9 %
GLUCOSE SERPL-MCNC: 118 MG/DL
HCT VFR BLD CALC: 41.7 %
HGB BLD-MCNC: 13.5 G/DL
IMM GRANULOCYTES NFR BLD AUTO: 0.6 %
LYMPHOCYTES # BLD AUTO: 1.54 K/UL
LYMPHOCYTES NFR BLD AUTO: 17.5 %
MAGNESIUM SERPL-MCNC: 1.9 MG/DL
MAN DIFF?: NORMAL
MCHC RBC-ENTMCNC: 32.3 PG
MCHC RBC-ENTMCNC: 32.4 GM/DL
MCV RBC AUTO: 99.8 FL
MONOCYTES # BLD AUTO: 0.8 K/UL
MONOCYTES NFR BLD AUTO: 9.1 %
NEUTROPHILS # BLD AUTO: 6.12 K/UL
NEUTROPHILS NFR BLD AUTO: 69.3 %
NT-PROBNP SERPL-MCNC: 327 PG/ML
PLATELET # BLD AUTO: 183 K/UL
POTASSIUM SERPL-SCNC: 4.3 MMOL/L
PROT SERPL-MCNC: 7.1 G/DL
RBC # BLD: 4.18 M/UL
RBC # FLD: 13.7 %
SODIUM SERPL-SCNC: 140 MMOL/L
WBC # FLD AUTO: 8.82 K/UL

## 2020-09-22 ENCOUNTER — APPOINTMENT (OUTPATIENT)
Dept: ELECTROPHYSIOLOGY | Facility: CLINIC | Age: 80
End: 2020-09-22
Payer: MEDICARE

## 2020-09-22 VITALS
HEART RATE: 64 BPM | SYSTOLIC BLOOD PRESSURE: 103 MMHG | TEMPERATURE: 98.4 F | OXYGEN SATURATION: 96 % | BODY MASS INDEX: 28.44 KG/M2 | DIASTOLIC BLOOD PRESSURE: 63 MMHG | RESPIRATION RATE: 16 BRPM | HEIGHT: 72 IN | WEIGHT: 210 LBS

## 2020-09-22 PROCEDURE — 99215 OFFICE O/P EST HI 40 MIN: CPT | Mod: 25

## 2020-09-22 PROCEDURE — 93000 ELECTROCARDIOGRAM COMPLETE: CPT | Mod: 59

## 2020-09-22 RX ORDER — MAGNESIUM OXIDE 241.3 MG/1000MG
400 TABLET ORAL TWICE DAILY
Qty: 30 | Refills: 2 | Status: DISCONTINUED | COMMUNITY
Start: 2020-09-16 | End: 2020-09-22

## 2020-09-22 RX ORDER — ALLOPURINOL 100 MG/1
100 TABLET ORAL DAILY
Qty: 90 | Refills: 1 | Status: ACTIVE | COMMUNITY

## 2020-09-22 RX ORDER — CARVEDILOL 6.25 MG/1
6.25 TABLET, FILM COATED ORAL TWICE DAILY
Qty: 30 | Refills: 1 | Status: DISCONTINUED | COMMUNITY
Start: 2020-09-16 | End: 2020-09-22

## 2020-09-22 RX ORDER — GABAPENTIN 300 MG/1
300 CAPSULE ORAL 3 TIMES DAILY
Qty: 270 | Refills: 1 | Status: ACTIVE | COMMUNITY

## 2020-09-22 RX ORDER — TAMSULOSIN HYDROCHLORIDE 0.4 MG/1
0.4 CAPSULE ORAL DAILY
Qty: 60 | Refills: 5 | Status: ACTIVE | COMMUNITY

## 2020-09-22 RX ORDER — ESCITALOPRAM OXALATE 10 MG/1
10 TABLET ORAL DAILY
Refills: 0 | Status: DISCONTINUED | COMMUNITY
Start: 2020-09-16 | End: 2020-09-22

## 2020-09-22 RX ORDER — CARVEDILOL 6.25 MG/1
6.25 TABLET, FILM COATED ORAL TWICE DAILY
Qty: 60 | Refills: 2 | Status: ACTIVE | COMMUNITY
Start: 2020-09-16 | End: 1900-01-01

## 2020-09-22 NOTE — PHYSICAL EXAM
[General Appearance - Well Developed] : well developed [Normal Appearance] : normal appearance [General Appearance - Well Nourished] : well nourished [Normal Conjunctiva] : the conjunctiva exhibited no abnormalities [Normal Oropharynx] : normal oropharynx [Normal Jugular Venous V Waves Present] : normal jugular venous V waves present [Respiration, Rhythm And Depth] : normal respiratory rhythm and effort [Exaggerated Use Of Accessory Muscles For Inspiration] : no accessory muscle use [Auscultation Breath Sounds / Voice Sounds] : lungs were clear to auscultation bilaterally [Heart Rate And Rhythm] : heart rate and rhythm were normal [Heart Sounds] : normal S1 and S2 [Murmurs] : no murmurs present [Edema] : no peripheral edema present [Bowel Sounds] : normal bowel sounds [Abdomen Soft] : soft [Abdomen Tenderness] : non-tender [Abnormal Walk] : normal gait [Nail Clubbing] : no clubbing of the fingernails [Cyanosis, Localized] : no localized cyanosis [Skin Color & Pigmentation] : normal skin color and pigmentation [Skin Turgor] : normal skin turgor [] : no rash [Oriented To Time, Place, And Person] : oriented to person, place, and time [Impaired Insight] : insight and judgment were intact [No Anxiety] : not feeling anxious

## 2020-09-22 NOTE — REASON FOR VISIT
[Follow-Up - From Hospitalization] : follow-up of a recent hospitalization for [FreeTextEntry1] : Cardiologist: Dr. Myers

## 2020-09-22 NOTE — DISCUSSION/SUMMARY
[FreeTextEntry1] : HELADIO DOBBS is a 80 year old male with hypertension, BPH, coronary artery disease (s/p PCI in 2009, patent vessels in 2020) who presents for follow up of PVCs and bradycardia.\par \par He reports that his symptoms have improved after diuresis and although they worsened at some point, his symptoms again have improved. I suspect that his symptoms are more likely driven by HFpEF rather than symptomatic bradycardia or significant PVCs. His PVC burden is only 4.1% and he is able to augment his sinus rate to the 100s. Therefore I do not believe he would benefit with a pacemaker. It would be beneficial to obtain a 14 day extended Holter (ZioPatch) so that we can correlate symptoms with arrhythmia as it is possible occasional PVCs could explain his symptoms.\par \par His most recent labs suggest that he was a bit hypovolemic. I informed him that he needs to weigh himself today and do his best to maintain that weight. He will return to taking Furosemide 20mg every other day. We will reduce Carvedilol to 6.25mg BID for better effect.\par \par Recommendations:\par - 2 week ZioPatch Monitor\par - Reduce Furosemide back to 20mg every other day\par - Reduce Carvedilol to 6.25mg BID\par \par Mango Melchor MD, FACC, RS\par Clinical Cardiac Electrophysiology

## 2020-09-22 NOTE — HISTORY OF PRESENT ILLNESS
[FreeTextEntry1] : HELADIO DOBBS is a 80 year old male with hypertension, BPH, coronary artery disease (s/p PCI in 2009, patent vessels in 2020) who presents for follow up.\par \par To summarize his history, he presented to [facility] in late August 2020 with complaints of dyspnea. Prior to evaluation at [facility] he was seen at [facility] in July 2020 also for dyspnea and syncope. He states he was walking outside in a shopping complex and was exerting himself more aggressively than usual. He then squatted down and then the next thing he clearly remembers is that he woke up in [facility]. He reportedly had a cardiac work up which was negative. He states that he had a concussion as well. He had a cardiac catheterization in 2018 in [place] for similar symptoms which showed patent coronaries (per patient report).\par \par Since his hospitalization at [facility] he had significant neck pain and was prescribed Oxycodone. About 2 weeks after that incident he developed chest pain, dyspnea and palpitations. During his hospitalization at [place], TTE demonstrated borderline EF of 50-55% without wall motion abnormalities. NST showed non-ischemic perfusion. Cardiac catheterization showed patent coronaries but with elevated filling pressures. He had baseline bradycardia and was able to augment his HR to >100 bpm so pacemaker was not pursued (patient was also not amenable at the time). He reports that after diuresis he felt much better.\par \par He recently followed up with Dr. Myers for follow up and was found to have ventricular bigeminy with monomorphic PVCs with right bundle, superior axis, positive concordance and isoelectric lead I. A Holter monitor was ordered which revealed a 4.1% PVC burden. He was started on Magnesium Oxide. He was continued on Lasix 20mg every 48 hrs. His carvedilol was reduced to Carvedilol 6.25mg BID.\par \par Today, he states that he initially felt well after being in the hospital but this past Friday (9/18/20) he noticed that he was more fatigued. He states that he ran about 200 feet. After that, he has been feeling quite poor. He reports that while wearing the Holter he has had less palpitations. However he reports that he has certainly had significant shortness of breath. The last 3-4 days he has been taking Lasix daily and is also drinking more water. He states that today he feels improved. He reports that he has gained weight.\par \par He denies any fevers, chills, cough, sweats, orthopnea, paroxysmal nocturnal dyspnea, peripheral edema, lightheadedness, dizziness, syncope, nausea, vomiting, melena, hematochezia, or hematemesis.

## 2020-09-30 ENCOUNTER — APPOINTMENT (OUTPATIENT)
Dept: CARDIOLOGY | Facility: CLINIC | Age: 80
End: 2020-09-30

## 2020-10-08 ENCOUNTER — EMERGENCY (EMERGENCY)
Facility: HOSPITAL | Age: 80
LOS: 1 days | Discharge: LEFT BEFORE DISCHARGE | End: 2020-10-08
Attending: EMERGENCY MEDICINE
Payer: MEDICARE

## 2020-10-08 VITALS
HEART RATE: 66 BPM | WEIGHT: 207.01 LBS | RESPIRATION RATE: 20 BRPM | OXYGEN SATURATION: 96 % | TEMPERATURE: 98 F | SYSTOLIC BLOOD PRESSURE: 134 MMHG | HEIGHT: 71 IN | DIASTOLIC BLOOD PRESSURE: 84 MMHG

## 2020-10-08 DIAGNOSIS — Z90.49 ACQUIRED ABSENCE OF OTHER SPECIFIED PARTS OF DIGESTIVE TRACT: Chronic | ICD-10-CM

## 2020-10-08 DIAGNOSIS — Z98.890 OTHER SPECIFIED POSTPROCEDURAL STATES: Chronic | ICD-10-CM

## 2020-10-08 LAB
ALBUMIN SERPL ELPH-MCNC: 3.9 G/DL — SIGNIFICANT CHANGE UP (ref 3.3–5.2)
ALP SERPL-CCNC: 71 U/L — SIGNIFICANT CHANGE UP (ref 40–120)
ALT FLD-CCNC: 12 U/L — SIGNIFICANT CHANGE UP
ANION GAP SERPL CALC-SCNC: 12 MMOL/L — SIGNIFICANT CHANGE UP (ref 5–17)
AST SERPL-CCNC: 21 U/L — SIGNIFICANT CHANGE UP
BASOPHILS # BLD AUTO: 0.03 K/UL — SIGNIFICANT CHANGE UP (ref 0–0.2)
BASOPHILS NFR BLD AUTO: 0.3 % — SIGNIFICANT CHANGE UP (ref 0–2)
BILIRUB SERPL-MCNC: 0.5 MG/DL — SIGNIFICANT CHANGE UP (ref 0.4–2)
BUN SERPL-MCNC: 33 MG/DL — HIGH (ref 8–20)
CALCIUM SERPL-MCNC: 9 MG/DL — SIGNIFICANT CHANGE UP (ref 8.6–10.2)
CHLORIDE SERPL-SCNC: 105 MMOL/L — SIGNIFICANT CHANGE UP (ref 98–107)
CO2 SERPL-SCNC: 20 MMOL/L — LOW (ref 22–29)
CREAT SERPL-MCNC: 1.36 MG/DL — HIGH (ref 0.5–1.3)
EOSINOPHIL # BLD AUTO: 0.27 K/UL — SIGNIFICANT CHANGE UP (ref 0–0.5)
EOSINOPHIL NFR BLD AUTO: 2.6 % — SIGNIFICANT CHANGE UP (ref 0–6)
GLUCOSE SERPL-MCNC: 102 MG/DL — HIGH (ref 70–99)
HCT VFR BLD CALC: 40 % — SIGNIFICANT CHANGE UP (ref 39–50)
HGB BLD-MCNC: 13.6 G/DL — SIGNIFICANT CHANGE UP (ref 13–17)
IMM GRANULOCYTES NFR BLD AUTO: 0.4 % — SIGNIFICANT CHANGE UP (ref 0–1.5)
LYMPHOCYTES # BLD AUTO: 1.28 K/UL — SIGNIFICANT CHANGE UP (ref 1–3.3)
LYMPHOCYTES # BLD AUTO: 12.5 % — LOW (ref 13–44)
MAGNESIUM SERPL-MCNC: 1.8 MG/DL — SIGNIFICANT CHANGE UP (ref 1.6–2.6)
MCHC RBC-ENTMCNC: 33.3 PG — SIGNIFICANT CHANGE UP (ref 27–34)
MCHC RBC-ENTMCNC: 34 GM/DL — SIGNIFICANT CHANGE UP (ref 32–36)
MCV RBC AUTO: 98 FL — SIGNIFICANT CHANGE UP (ref 80–100)
MONOCYTES # BLD AUTO: 0.78 K/UL — SIGNIFICANT CHANGE UP (ref 0–0.9)
MONOCYTES NFR BLD AUTO: 7.6 % — SIGNIFICANT CHANGE UP (ref 2–14)
NEUTROPHILS # BLD AUTO: 7.84 K/UL — HIGH (ref 1.8–7.4)
NEUTROPHILS NFR BLD AUTO: 76.6 % — SIGNIFICANT CHANGE UP (ref 43–77)
PLATELET # BLD AUTO: 185 K/UL — SIGNIFICANT CHANGE UP (ref 150–400)
POTASSIUM SERPL-MCNC: 4.2 MMOL/L — SIGNIFICANT CHANGE UP (ref 3.5–5.3)
POTASSIUM SERPL-SCNC: 4.2 MMOL/L — SIGNIFICANT CHANGE UP (ref 3.5–5.3)
PROT SERPL-MCNC: 6.6 G/DL — SIGNIFICANT CHANGE UP (ref 6.6–8.7)
RBC # BLD: 4.08 M/UL — LOW (ref 4.2–5.8)
RBC # FLD: 13.2 % — SIGNIFICANT CHANGE UP (ref 10.3–14.5)
SODIUM SERPL-SCNC: 137 MMOL/L — SIGNIFICANT CHANGE UP (ref 135–145)
TROPONIN T SERPL-MCNC: <0.01 NG/ML — SIGNIFICANT CHANGE UP (ref 0–0.06)
WBC # BLD: 10.24 K/UL — SIGNIFICANT CHANGE UP (ref 3.8–10.5)
WBC # FLD AUTO: 10.24 K/UL — SIGNIFICANT CHANGE UP (ref 3.8–10.5)

## 2020-10-08 PROCEDURE — 93010 ELECTROCARDIOGRAM REPORT: CPT

## 2020-10-08 PROCEDURE — 85025 COMPLETE CBC W/AUTO DIFF WBC: CPT

## 2020-10-08 PROCEDURE — 71046 X-RAY EXAM CHEST 2 VIEWS: CPT

## 2020-10-08 PROCEDURE — 93005 ELECTROCARDIOGRAM TRACING: CPT

## 2020-10-08 PROCEDURE — 80053 COMPREHEN METABOLIC PANEL: CPT

## 2020-10-08 PROCEDURE — 36415 COLL VENOUS BLD VENIPUNCTURE: CPT

## 2020-10-08 PROCEDURE — 83735 ASSAY OF MAGNESIUM: CPT

## 2020-10-08 PROCEDURE — 71046 X-RAY EXAM CHEST 2 VIEWS: CPT | Mod: 26

## 2020-10-08 PROCEDURE — 99284 EMERGENCY DEPT VISIT MOD MDM: CPT | Mod: 25

## 2020-10-08 PROCEDURE — 99285 EMERGENCY DEPT VISIT HI MDM: CPT

## 2020-10-08 PROCEDURE — 84484 ASSAY OF TROPONIN QUANT: CPT

## 2020-10-08 RX ORDER — ASPIRIN/CALCIUM CARB/MAGNESIUM 324 MG
162 TABLET ORAL ONCE
Refills: 0 | Status: COMPLETED | OUTPATIENT
Start: 2020-10-08 | End: 2020-10-08

## 2020-10-08 RX ADMIN — Medication 162 MILLIGRAM(S): at 18:56

## 2020-10-08 NOTE — ED PROVIDER NOTE - OBJECTIVE STATEMENT
80 YOM PMH CAD, TIA 2010, HTN, diastolic HF here with chest pain, palpitation and sob for 1.5 hours today that self resolved. Sees Dr. Myers ([facility]). Recent cath last month with EF 45%. Former smoker.

## 2020-10-08 NOTE — ED ADULT NURSE NOTE - OBJECTIVE STATEMENT
Assumed care of patient at 1830, alert and oriented x4, c/o chest discomfort and SOB on exertion. pt stated " I am just here because I want my troponin levels checked and if there fine that's all I need" PT refused an IV. MD aware and at bedside. on CM, NSR Noted. Labs sent, awaiting results. No s/s of distress noted.

## 2020-10-08 NOTE — ED ADULT TRIAGE NOTE - CHIEF COMPLAINT QUOTE
pt reports a pounding sensation in chest and SOB around 2:30pm today. reports feeling better at this time.

## 2020-10-08 NOTE — ED PROVIDER NOTE - NS ED ROS FT
ROS:  GEN: (-) fevers/chills  HEENT: (-) visual change, (-) nasal congestion/rhinorrhea  NECK: (-) stiffness, (-) swelling  RESP: (-) shortness of breath, (-) cough  CV: (+) chest pain, (+) palpitations  GI: (-) nausea, (-) vomiting, (-) pain, (-) constipation, (-) diarrhea  : (-) frequency, (-) hematuria, (-) dysuria  EXT: (-) edema  NEURO: (-) weakness, (-) headache, (-) dizziness, (-) syncope  MSK: (-) muscle pain

## 2020-10-08 NOTE — ED PROVIDER NOTE - PHYSICAL EXAMINATION
Vital Signs per nursing documentation  Gen: well appearing, no acute distress  HEENT: NCAT, MMM  Cardiac: regular rate rhythm, normal S1S2  Chest: clear to auscultation bilateral, no wheezes or crackles  Abdomen: soft, non tender non distended  Extremity: no gross deformity, good perfusion  Skin: no rash  Neuro: nonfocal neuro exam, gait steady

## 2020-10-13 ENCOUNTER — NON-APPOINTMENT (OUTPATIENT)
Age: 80
End: 2020-10-13

## 2020-10-13 ENCOUNTER — APPOINTMENT (OUTPATIENT)
Dept: ELECTROPHYSIOLOGY | Facility: CLINIC | Age: 80
End: 2020-10-13
Payer: MEDICARE

## 2020-10-13 VITALS
DIASTOLIC BLOOD PRESSURE: 76 MMHG | HEART RATE: 60 BPM | OXYGEN SATURATION: 97 % | SYSTOLIC BLOOD PRESSURE: 108 MMHG | TEMPERATURE: 98.7 F

## 2020-10-13 PROCEDURE — 93000 ELECTROCARDIOGRAM COMPLETE: CPT | Mod: 59

## 2020-10-13 PROCEDURE — 99213 OFFICE O/P EST LOW 20 MIN: CPT | Mod: 25

## 2020-10-13 RX ORDER — CARVEDILOL 3.12 MG/1
3.12 TABLET, FILM COATED ORAL TWICE DAILY
Qty: 180 | Refills: 1 | Status: ACTIVE | COMMUNITY
Start: 2020-10-13 | End: 1900-01-01

## 2020-10-13 NOTE — HISTORY OF PRESENT ILLNESS
[FreeTextEntry1] : HELADIO DOBBS is a 80 year old male with hypertension, BPH, coronary artery disease (s/p PCI in 2009, patent vessels in 2020) who presents for follow up.\par \par To summarize his history, he presented to [facility] in late August 2020 with complaints of dyspnea. Prior to evaluation at [facility] he was seen at [facility] in July 2020 also for dyspnea and syncope. He states he was walking outside in a shopping complex and was exerting himself more aggressively than usual. He then squatted down and then the next thing he clearly remembers is that he woke up in [facility] [place]. He reportedly had a cardiac work up which was negative. He states that he had a concussion as well. He had a cardiac catheterization in 2018 in [place] for similar symptoms which showed patent coronaries (per patient report).\par \par Since his hospitalization at [facility] he had significant neck pain and was prescribed Oxycodone. About 2 weeks after that incident he developed chest pain, dyspnea and palpitations. During his hospitalization at [facility], TTE demonstrated borderline EF of 50-55% without wall motion abnormalities. NST showed non-ischemic perfusion. Cardiac catheterization showed patent coronaries but with elevated filling pressures. He had baseline bradycardia and was able to augment his HR to >100 bpm so pacemaker was not pursued (patient was also not amenable at the time). He reports that after diuresis he felt much better.\par \par He recently followed up with Dr. Myers for follow up and was found to have ventricular bigeminy with monomorphic PVCs with right bundle, superior axis, positive concordance and isoelectric lead I. A Holter monitor was ordered which revealed a 4.1% PVC burden. He was started on Magnesium Oxide. He was continued on Lasix 20mg every 48 hrs. His carvedilol was reduced to Carvedilol 6.25mg BID.\par \par He wore a second monitor for 12 days which again demonstrated 4.1% burden but also showed some periods of AIVR which lasted for at least 20 seconds with interceding sinus betas. He did not have any symptoms during these episodes. The time he did have symptoms his rhythm correlated to normal rhythm.\par \par Today he is frustrated and states that he may be moving soon. He reports that shortly after returning in his monitor he presented to [facility]. He was unhappy with the care so he left AMA. He otherwise does not have any significant complaints.

## 2020-10-13 NOTE — PHYSICAL EXAM
[General Appearance - Well Developed] : well developed [Normal Appearance] : normal appearance [General Appearance - Well Nourished] : well nourished [Normal Conjunctiva] : the conjunctiva exhibited no abnormalities [Normal Jugular Venous V Waves Present] : normal jugular venous V waves present [] : no respiratory distress [Respiration, Rhythm And Depth] : normal respiratory rhythm and effort [Exaggerated Use Of Accessory Muscles For Inspiration] : no accessory muscle use [Auscultation Breath Sounds / Voice Sounds] : lungs were clear to auscultation bilaterally [Heart Rate And Rhythm] : heart rate and rhythm were normal [Heart Sounds] : normal S1 and S2 [Murmurs] : no murmurs present [Edema] : no peripheral edema present [Bowel Sounds] : normal bowel sounds [Abdomen Soft] : soft [Abdomen Tenderness] : non-tender [Abnormal Walk] : normal gait [Nail Clubbing] : no clubbing of the fingernails [Cyanosis, Localized] : no localized cyanosis [Oriented To Time, Place, And Person] : oriented to person, place, and time [Impaired Insight] : insight and judgment were intact [FreeTextEntry1] : No suicidal or homicidal ideation.

## 2020-10-13 NOTE — DISCUSSION/SUMMARY
[FreeTextEntry1] : HELADIO DOBBS is a 80 year old male with hypertension, BPH, coronary artery disease (s/p PCI in 2009, patent vessels in 2020) who presents for follow up of PVCs and bradycardia.\par \par His repeat monitor showed 4.1% PVC burden and symptoms that do not correlate with arrhythmia. He did have a period of AIVR but he was asymptomatic with this. I believe that his symptoms are unlikely related to his arrhythmia. However I do think we should increase his Carvedilol to 9.375mg BID to suppress arrhythmia.\par \par We discussed possibility of EP study to assess for inducibility of ventricular arrhythmia since he has borderline EF (45% by cath/stress, 50-55% by TTE) with plans for ICD if positive. He however does not want any invasive intervention. Therefore, as above, we will increase his Carvedilol dose.\par \par Recommendations:\par - Continue Furosemide, follow up with General Cardiology\par - Increase Carvedilol to 9.375mg BID\par \par RTC in 1 month.\par \par Mango Melchor MD, FACC, FHRS\par Clinical Cardiac Electrophysiology

## 2020-10-19 RX ORDER — FUROSEMIDE 20 MG/1
20 TABLET ORAL
Qty: 30 | Refills: 0 | Status: ACTIVE | COMMUNITY
Start: 2020-09-16 | End: 1900-01-01

## 2020-10-20 ENCOUNTER — APPOINTMENT (OUTPATIENT)
Dept: CARDIOLOGY | Facility: CLINIC | Age: 80
End: 2020-10-20
Payer: MEDICARE

## 2020-10-20 ENCOUNTER — NON-APPOINTMENT (OUTPATIENT)
Age: 80
End: 2020-10-20

## 2020-10-20 VITALS
TEMPERATURE: 97.5 F | HEIGHT: 72 IN | HEART RATE: 64 BPM | OXYGEN SATURATION: 99 % | SYSTOLIC BLOOD PRESSURE: 138 MMHG | WEIGHT: 208.4 LBS | BODY MASS INDEX: 28.23 KG/M2 | DIASTOLIC BLOOD PRESSURE: 80 MMHG

## 2020-10-20 DIAGNOSIS — I25.10 ATHEROSCLEROTIC HEART DISEASE OF NATIVE CORONARY ARTERY W/OUT ANGINA PECTORIS: ICD-10-CM

## 2020-10-20 DIAGNOSIS — I10 ESSENTIAL (PRIMARY) HYPERTENSION: ICD-10-CM

## 2020-10-20 DIAGNOSIS — I50.30 UNSPECIFIED DIASTOLIC (CONGESTIVE) HEART FAILURE: ICD-10-CM

## 2020-10-20 DIAGNOSIS — R06.00 DYSPNEA, UNSPECIFIED: ICD-10-CM

## 2020-10-20 PROCEDURE — 99215 OFFICE O/P EST HI 40 MIN: CPT

## 2020-10-20 PROCEDURE — 93000 ELECTROCARDIOGRAM COMPLETE: CPT

## 2020-10-20 RX ORDER — ATORVASTATIN CALCIUM 20 MG/1
20 TABLET, FILM COATED ORAL
Qty: 90 | Refills: 3 | Status: ACTIVE | COMMUNITY
Start: 2020-10-20 | End: 1900-01-01

## 2020-10-20 NOTE — PHYSICAL EXAM
[General Appearance - Well Developed] : well developed [General Appearance - Well Nourished] : well nourished [Normal Appearance] : normal appearance [Normal Oral Mucosa] : normal oral mucosa [Normal Conjunctiva] : the conjunctiva exhibited no abnormalities [No Oral Pallor] : no oral pallor [No Oral Cyanosis] : no oral cyanosis [No Jugular Venous Heller A Waves] : no jugular venous heller A waves [Normal Jugular Venous A Waves Present] : normal jugular venous A waves present [Normal Jugular Venous V Waves Present] : normal jugular venous V waves present [Exaggerated Use Of Accessory Muscles For Inspiration] : no accessory muscle use [Respiration, Rhythm And Depth] : normal respiratory rhythm and effort [Heart Sounds] : normal S1 and S2 [Heart Rate And Rhythm] : heart rate and rhythm were normal [Auscultation Breath Sounds / Voice Sounds] : lungs were clear to auscultation bilaterally [Murmurs] : no murmurs present [Edema] : no peripheral edema present [Bowel Sounds] : normal bowel sounds [Abdomen Soft] : soft [Abdomen Tenderness] : non-tender [Abnormal Walk] : normal gait [Abdomen Mass (___ Cm)] : no abdominal mass palpated [Skin Color & Pigmentation] : normal skin color and pigmentation [Cyanosis, Localized] : no localized cyanosis [Nail Clubbing] : no clubbing of the fingernails [] : no rash [No Skin Ulcers] : no skin ulcer [Skin Lesions] : no skin lesions [No Venous Stasis] : no venous stasis [Oriented To Time, Place, And Person] : oriented to person, place, and time [No Xanthoma] : no  xanthoma was observed [Affect] : the affect was normal [Impaired Insight] : insight and judgment were intact [Mood] : the mood was normal [No Anxiety] : not feeling anxious [FreeTextEntry1] : No suicidal or homicidal ideation.

## 2020-10-20 NOTE — HISTORY OF PRESENT ILLNESS
[FreeTextEntry1] : .reason for follow up: hypertension and coronary artery disease and Ischemic cardiomyopathy . diastolic heart failure moderate aortic stenosis. with bradycardia. , PVcs.\par \par HPI for today: :  feels good. no cehst pain. no headacehs. no dizziness. \par no chest pain. no headaches. nodizzienss,.\par  compliants with meds. he can walk around for 30-40 . no chest pain . no dyspnea. no plapitaitons. \par \par \par old note : HELADIO DOBBS is a 80 year old male with hypertension, BPH, coronary artery disease (s/p PCI in 2009, patent vessels in 2020) who presents for follow up.\par \par To summarize his history, he presented to [facility] in late August 2020 with complaints of dyspnea. Prior to evaluation at [facility] he was seen at [facility] in July 2020 also for dyspnea and syncope. He states he was walking outside in a shopping complex and was exerting himself more aggressively than usual. He then squatted down and then the next thing he clearly remembers is that he woke up [place] [facility] [place] He reportedly had a cardiac work up which was negative. He states that he had a concussion as well. He had a cardiac catheterization in 2018 in [place] for similar symptoms which showed patent coronaries (per patient report).\par \par Since his hospitalization at [place] he had significant neck pain and was prescribed Oxycodone. About 2 weeks after that incident he developed chest pain, dyspnea and palpitations. During his hospitalization at [place], TTE demonstrated borderline EF of 50-55% without wall motion abnormalities. NST showed non-ischemic perfusion. Cardiac catheterization showed patent coronaries but with elevated filling pressures. He had baseline bradycardia and was able to augment his HR to >100 bpm so pacemaker was not pursued (patient was also not amenable at the time). He reports that after diuresis he felt much better.\par \par He recently followed up with Dr. Myers for follow up and was found to have ventricular bigeminy with monomorphic PVCs with right bundle, superior axis, positive concordance and isoelectric lead I. A Holter monitor was ordered which revealed a 4.1% PVC burden. He was started on Magnesium Oxide. He was continued on Lasix 20mg every 48 hrs. His carvedilol was reduced to Carvedilol 6.25mg BID.\par \par He wore a second monitor for 12 days which again demonstrated 4.1% burden but also showed some periods of AIVR which lasted for at least 20 seconds with interceding sinus betas. He did not have any symptoms during these episodes. The time he did have symptoms his rhythm correlated to normal rhythm.\par \par Today he is frustrated and states that he may be moving soon. He reports that shortly after returning in his monitor he presented to [facility]. He was unhappy with the care so he left AMA. He otherwise does not have any significant complaints.

## 2020-10-20 NOTE — DISCUSSION/SUMMARY
[Patient] : the patient [Benefits] : benefits [Risks] : risks [Alternatives] : alternatives [With Me] : with me [FreeTextEntry1] : HELADIO DOBBS is a 80 year old male with hypertension, BPH, coronary artery disease (s/p PCI in 2009, patent vessels in 2020) who presents for follow up of PVCs and bradycardia.\par  \par 1) heart failure with preserve ejection fraction : LVEF 45%.  recent cath :  no obstructive stenosis. \par ct current diuresis.  Wants to defere ARB. \par alcohol intake gin. \par 2) coronary artery disease : s/p PCI in 2009. recent cath : sept 2020 aspirin.  initiate lipitor 20 mg daily. \par 3) PVCs: ct coreg. asymptomatic. Pvc burden 5%  [___ Month(s)] : [unfilled] month(s)

## 2020-11-17 ENCOUNTER — APPOINTMENT (OUTPATIENT)
Dept: ELECTROPHYSIOLOGY | Facility: CLINIC | Age: 80
End: 2020-11-17

## 2020-11-17 DIAGNOSIS — I49.3 VENTRICULAR PREMATURE DEPOLARIZATION: ICD-10-CM

## 2020-11-24 ENCOUNTER — RX CHANGE (OUTPATIENT)
Age: 80
End: 2020-11-24

## 2021-03-23 ENCOUNTER — APPOINTMENT (OUTPATIENT)
Dept: CARDIOLOGY | Facility: CLINIC | Age: 81
End: 2021-03-23

## 2021-12-22 NOTE — ED CDU PROVIDER SUBSEQUENT DAY NOTE - CARDIOVASCULAR [-], MLM
well developed, well nourished , in no acute distress
no chest pain/no palpitations/no peripheral edema

## 2022-11-08 NOTE — H&P ADULT - RESPIRATORY AND THORAX
Today you are seen after fall all of your imaging was reassuring.  Please follow-up your primary care provider within 2 days for further management.  If your symptoms worsen prior please return to the emergency department.   details…

## 2024-01-05 NOTE — ED PROVIDER NOTE - DR. NAME
Stairs: GOAL: Pt will ascend/descend 5 stairs independently using unilateral rail in order to return home safety in 2 weeks./balance training/bed mobility training/gait training/strengthening/transfer training Tevin

## 2024-07-18 NOTE — ED ADULT NURSE REASSESSMENT NOTE - BOWEL SOUNDS LUQ
[facility]    Background: Transitional Care Management program identified per system criteria and reviewed by [facility] team for possible outreach.    Assessment: Upon chart review, CCR Team member will not proceed with patient outreach related to this episode of Transitional Care Management program due to reason below:    Patient declined to answer all post hospital discharge questions with CCRC team member and disconnected call.    Plan: Transitional Care Management episode addressed appropriately per reason noted above.      Sonja Alexandre MA  [facility]    *Connected Care Resource Team does NOT follow patient ongoing. Referrals are identified based on internal discharge reports and the outreach is to ensure patient has an understanding of their discharge instructions.  
present

## 2024-09-11 NOTE — ED CDU PROVIDER INITIAL DAY NOTE - NO PERTINENT FAMILY HISTORY
"Reason for Disposition  • [1] Other NON-URGENT information for PCP AND [2] does not require PCP response    Additional Information  • Negative: Lab calling with strep throat test results and triager can call in prescription  • Negative: Lab calling with urinalysis test results and triager can call in prescription  • Negative: Medication questions  • Negative: Medication renewal and refill questions  • Negative: Pre-operative or pre-procedural questions  • Negative: ED call to PCP (i.e., primary care provider; doctor, NP, or PA)  • Negative: Doctor (or NP/PA) call to PCP  • Negative: Call about patient who is currently hospitalized  • Negative: Lab or radiology calling with CRITICAL test results  • Negative: [1] Follow-up call from patient regarding patient's clinical status AND [2] information urgent  • Negative: [1] Caller requests to speak ONLY to PCP AND [2] URGENT question  • Negative: [1] Caller requests to speak to PCP now AND [2] won't tell us reason for call  (Exception: If 10 pm to 6 am, caller must first discuss reason for the call.)  • Negative: Notification of hospital admission  • Negative: Notification of death  • Negative: Caller requesting lab results  (Exception: Routine or non-urgent lab result.)  • Negative: Lab or radiology calling with test results  • Negative: [1] Follow-up call from patient regarding patient's clinical status AND [2] information NON-URGENT  • Negative: [1] Caller requests to speak ONLY to PCP AND [2] NON-URGENT question  • Negative: Caller requesting an appointment, triage offered and declined  • Negative: Caller requesting routine or non-urgent lab result    Answer Assessment - Initial Assessment Questions  1. REASON FOR CALL or QUESTION: \"What is your reason for calling today?\" or \"How can I best  help you?\" or \"What question do you have that I can help answer?\"      wanted to cancel appt tomorrow at 1pm with Dr Curtis--will see if i can send them a message to cancel it but " also requested that he call back in the morning to make sure they received the messge to cancel. Pt verbalized understanding.  2. CALLER: Document the source of call. (e.g., laboratory, patient).      Patient    Protocols used: PCP Call - No Triage-ADULT-     <<----- Click to add NO pertinent Family History

## 2025-03-15 NOTE — DISCHARGE NOTE PROVIDER - NSDCCONDITION_GEN_ALL_CORE
MICHELLE SALINAS  89y  Male      Patient is a 89y old  Male who presents with a chief complaint of Generalized weakness and miosis (15 Mar 2025 11:11)      INTERVAL HPI/OVERNIGHT EVENTS: no acute events overnight. no nursing concerns.          T(C): 36.4 (03-15-25 @ 05:31), Max: 36.7 (03-14-25 @ 14:04)  HR: 67 (03-15-25 @ 05:31) (67 - 75)  BP: 110/67 (03-15-25 @ 05:31) (110/67 - 130/82)  RR: 18 (03-15-25 @ 05:31) (18 - 18)  SpO2: 96% (03-14-25 @ 18:15) (96% - 98%)  Wt(kg): --Vital Signs Last 24 Hrs  T(C): 36.4 (15 Mar 2025 05:31), Max: 36.7 (14 Mar 2025 14:04)  T(F): 97.6 (15 Mar 2025 05:31), Max: 98.1 (14 Mar 2025 14:04)  HR: 67 (15 Mar 2025 05:31) (67 - 75)  BP: 110/67 (15 Mar 2025 05:31) (110/67 - 130/82)  BP(mean): 84 (14 Mar 2025 18:15) (84 - 84)  RR: 18 (15 Mar 2025 05:31) (18 - 18)  SpO2: 96% (14 Mar 2025 18:15) (96% - 98%)    Parameters below as of 14 Mar 2025 18:15  Patient On (Oxygen Delivery Method): room air          03-14-25 @ 07:01  -  03-15-25 @ 07:00  --------------------------------------------------------  IN: 370 mL / OUT: 0 mL / NET: 370 mL        PHYSICAL EXAM:  GENERAL: elderly M, frail appearing, NAD  EYES: constricted symmetrically but more responsive to light then yesterdays exam  PSYCH: no agitation, baseline mentation  NERVOUS SYSTEM:  Alert & Oriented to self and place with some prompting  PULMONARY: symmetrical chest rise, no accessory muscle use  GI:Nondistended  EXTREMITIES: No clubbing, cyanosis, or edema  SKIN: No rashes or lesions    Consultant(s) Notes Reviewed:  [x ] YES  [ ] NO    Discussed with Consultants/Other Providers [ x] YES     LABS                          13.2   8.03  )-----------( 226      ( 15 Mar 2025 07:17 )             39.4     03-15    140  |  105  |  19  ----------------------------<  97  4.4   |  25  |  1.0    Ca    8.6      15 Mar 2025 07:17  Mg     2.0     03-15    TPro  6.5  /  Alb  3.8  /  TBili  0.5  /  DBili  x   /  AST  23  /  ALT  15  /  AlkPhos  138[H]  03-14      Urinalysis Basic - ( 15 Mar 2025 07:17 )    Color: x / Appearance: x / SG: x / pH: x  Gluc: 97 mg/dL / Ketone: x  / Bili: x / Urobili: x   Blood: x / Protein: x / Nitrite: x   Leuk Esterase: x / RBC: x / WBC x   Sq Epi: x / Non Sq Epi: x / Bacteria: x    Lactate Trend  03-13 @ 13:12 Lactate:0.8   03-13 @ 00:10 Lactate:2.5     RADIOLOGY & ADDITIONAL TESTS:  - no images 3/15  Imaging Personally Reviewed:  [ ] YES  [ ] NO    HEALTH ISSUES - PROBLEM Dx:    MEDICATIONS  (STANDING):  atorvastatin 40 milliGRAM(s) Oral at bedtime  brimonidine 0.2% Ophthalmic Solution 1 Drop(s) Left EYE two times a day  donepezil 20 milliGRAM(s) Oral at bedtime  dorzolamide 2% Ophthalmic Solution 1 Drop(s) Left EYE <User Schedule>  enoxaparin Injectable 40 milliGRAM(s) SubCutaneous every 24 hours  escitalopram 10 milliGRAM(s) Oral daily  latanoprost 0.005% Ophthalmic Solution 1 Drop(s) Both EYES at bedtime  lisinopril 10 milliGRAM(s) Oral daily  memantine 10 milliGRAM(s) Oral two times a day  multivitamin 1 Tablet(s) Oral daily    MEDICATIONS  (PRN):     Stable